# Patient Record
Sex: FEMALE | Race: WHITE | NOT HISPANIC OR LATINO
[De-identification: names, ages, dates, MRNs, and addresses within clinical notes are randomized per-mention and may not be internally consistent; named-entity substitution may affect disease eponyms.]

---

## 2017-10-18 PROBLEM — Z00.00 ENCOUNTER FOR PREVENTIVE HEALTH EXAMINATION: Status: ACTIVE | Noted: 2017-10-18

## 2017-11-01 ENCOUNTER — APPOINTMENT (OUTPATIENT)
Dept: HEART AND VASCULAR | Facility: CLINIC | Age: 82
End: 2017-11-01
Payer: MEDICARE

## 2017-11-01 VITALS
HEIGHT: 62.83 IN | WEIGHT: 113 LBS | HEART RATE: 68 BPM | DIASTOLIC BLOOD PRESSURE: 62 MMHG | OXYGEN SATURATION: 93 % | SYSTOLIC BLOOD PRESSURE: 140 MMHG | BODY MASS INDEX: 20.02 KG/M2 | TEMPERATURE: 98.8 F

## 2017-11-01 DIAGNOSIS — I35.0 NONRHEUMATIC AORTIC (VALVE) STENOSIS: ICD-10-CM

## 2017-11-01 DIAGNOSIS — C50.919 MALIGNANT NEOPLASM OF UNSPECIFIED SITE OF UNSPECIFIED FEMALE BREAST: ICD-10-CM

## 2017-11-01 DIAGNOSIS — I25.10 ATHEROSCLEROTIC HEART DISEASE OF NATIVE CORONARY ARTERY W/OUT ANGINA PECTORIS: ICD-10-CM

## 2017-11-01 DIAGNOSIS — Z86.19 PERSONAL HISTORY OF OTHER INFECTIOUS AND PARASITIC DISEASES: ICD-10-CM

## 2017-11-01 DIAGNOSIS — Z80.9 FAMILY HISTORY OF MALIGNANT NEOPLASM, UNSPECIFIED: ICD-10-CM

## 2017-11-01 DIAGNOSIS — Z82.49 FAMILY HISTORY OF ISCHEMIC HEART DISEASE AND OTHER DISEASES OF THE CIRCULATORY SYSTEM: ICD-10-CM

## 2017-11-01 PROCEDURE — 93000 ELECTROCARDIOGRAM COMPLETE: CPT

## 2017-11-01 PROCEDURE — 93306 TTE W/DOPPLER COMPLETE: CPT

## 2017-11-01 PROCEDURE — 99204 OFFICE O/P NEW MOD 45 MIN: CPT | Mod: 25

## 2017-11-01 RX ORDER — UBIDECARENONE/VIT E ACET 100MG-5
CAPSULE ORAL
Refills: 0 | Status: ACTIVE | COMMUNITY

## 2017-11-01 RX ORDER — POTASSIUM CHLORIDE 10 MEQ
10 CAPSULE, EXTENDED RELEASE ORAL DAILY
Refills: 0 | Status: ACTIVE | COMMUNITY

## 2017-11-01 RX ORDER — OSPEMIFENE 60 MG/1
60 TABLET, FILM COATED ORAL
Refills: 0 | Status: ACTIVE | COMMUNITY

## 2017-11-01 RX ORDER — ETHACRYNIC ACID 25 MG/1
25 TABLET ORAL DAILY
Refills: 0 | Status: ACTIVE | COMMUNITY

## 2017-11-01 RX ORDER — GABAPENTIN 300 MG/1
300 CAPSULE ORAL DAILY
Refills: 0 | Status: ACTIVE | COMMUNITY

## 2017-11-01 RX ORDER — ANASTROZOLE TABLETS 1 MG/1
1 TABLET ORAL
Refills: 0 | Status: ACTIVE | COMMUNITY
Start: 2017-11-01

## 2019-04-19 ENCOUNTER — INPATIENT (INPATIENT)
Facility: HOSPITAL | Age: 84
LOS: 5 days | Discharge: HOME HEALTH | DRG: 291 | End: 2019-04-25
Attending: INTERNAL MEDICINE | Admitting: INTERNAL MEDICINE
Payer: MEDICARE

## 2019-04-19 VITALS
DIASTOLIC BLOOD PRESSURE: 57 MMHG | SYSTOLIC BLOOD PRESSURE: 118 MMHG | WEIGHT: 117.07 LBS | TEMPERATURE: 99 F | RESPIRATION RATE: 18 BRPM | OXYGEN SATURATION: 96 % | HEART RATE: 60 BPM

## 2019-04-19 DIAGNOSIS — Z95.2 PRESENCE OF PROSTHETIC HEART VALVE: Chronic | ICD-10-CM

## 2019-04-19 DIAGNOSIS — I50.21 ACUTE SYSTOLIC (CONGESTIVE) HEART FAILURE: ICD-10-CM

## 2019-04-19 DIAGNOSIS — I48.91 UNSPECIFIED ATRIAL FIBRILLATION: ICD-10-CM

## 2019-04-19 DIAGNOSIS — Z98.890 OTHER SPECIFIED POSTPROCEDURAL STATES: Chronic | ICD-10-CM

## 2019-04-19 DIAGNOSIS — Z92.89 PERSONAL HISTORY OF OTHER MEDICAL TREATMENT: Chronic | ICD-10-CM

## 2019-04-19 DIAGNOSIS — I48.1 PERSISTENT ATRIAL FIBRILLATION: ICD-10-CM

## 2019-04-19 DIAGNOSIS — I10 ESSENTIAL (PRIMARY) HYPERTENSION: ICD-10-CM

## 2019-04-19 DIAGNOSIS — I50.9 HEART FAILURE, UNSPECIFIED: ICD-10-CM

## 2019-04-19 DIAGNOSIS — I35.9 NONRHEUMATIC AORTIC VALVE DISORDER, UNSPECIFIED: ICD-10-CM

## 2019-04-19 LAB
ALBUMIN SERPL ELPH-MCNC: 3 G/DL — LOW (ref 3.3–5)
ALBUMIN SERPL ELPH-MCNC: 3 G/DL — LOW (ref 3.3–5)
ALP SERPL-CCNC: 164 U/L — HIGH (ref 40–120)
ALP SERPL-CCNC: 171 U/L — HIGH (ref 40–120)
ALT FLD-CCNC: 23 U/L — SIGNIFICANT CHANGE UP (ref 10–45)
ALT FLD-CCNC: SIGNIFICANT CHANGE UP U/L (ref 10–45)
ANION GAP SERPL CALC-SCNC: 11 MMOL/L — SIGNIFICANT CHANGE UP (ref 5–17)
AST SERPL-CCNC: 56 U/L — HIGH (ref 10–40)
AST SERPL-CCNC: SIGNIFICANT CHANGE UP U/L (ref 10–40)
BILIRUB DIRECT SERPL-MCNC: <0.2 MG/DL — SIGNIFICANT CHANGE UP (ref 0–0.2)
BILIRUB INDIRECT FLD-MCNC: >0.3 MG/DL — SIGNIFICANT CHANGE UP (ref 0.2–1)
BILIRUB SERPL-MCNC: 0.4 MG/DL — SIGNIFICANT CHANGE UP (ref 0.2–1.2)
BILIRUB SERPL-MCNC: 0.5 MG/DL — SIGNIFICANT CHANGE UP (ref 0.2–1.2)
BUN SERPL-MCNC: 27 MG/DL — HIGH (ref 7–23)
CALCIUM SERPL-MCNC: 8.8 MG/DL — SIGNIFICANT CHANGE UP (ref 8.4–10.5)
CHLORIDE SERPL-SCNC: 105 MMOL/L — SIGNIFICANT CHANGE UP (ref 96–108)
CK MB CFR SERPL CALC: 1.5 NG/ML — SIGNIFICANT CHANGE UP (ref 0–6.7)
CK SERPL-CCNC: 22 U/L — LOW (ref 25–170)
CO2 SERPL-SCNC: 26 MMOL/L — SIGNIFICANT CHANGE UP (ref 22–31)
CREAT SERPL-MCNC: 0.77 MG/DL — SIGNIFICANT CHANGE UP (ref 0.5–1.3)
EXTRA BLUE TOP TUBE: SIGNIFICANT CHANGE UP
GAS PNL BLDV: SIGNIFICANT CHANGE UP
GLUCOSE SERPL-MCNC: 111 MG/DL — HIGH (ref 70–99)
HCT VFR BLD CALC: 35.8 % — SIGNIFICANT CHANGE UP (ref 34.5–45)
HGB BLD-MCNC: 10.1 G/DL — LOW (ref 11.5–15.5)
MAGNESIUM SERPL-MCNC: 2.2 MG/DL — SIGNIFICANT CHANGE UP (ref 1.6–2.6)
MCHC RBC-ENTMCNC: 24.6 PG — LOW (ref 27–34)
MCHC RBC-ENTMCNC: 28.2 GM/DL — LOW (ref 32–36)
MCV RBC AUTO: 87.3 FL — SIGNIFICANT CHANGE UP (ref 80–100)
NRBC # BLD: 0 /100 WBCS — SIGNIFICANT CHANGE UP (ref 0–0)
NT-PROBNP SERPL-SCNC: 3964 PG/ML — HIGH (ref 0–300)
PLATELET # BLD AUTO: 203 K/UL — SIGNIFICANT CHANGE UP (ref 150–400)
POTASSIUM SERPL-MCNC: 3.8 MMOL/L — SIGNIFICANT CHANGE UP (ref 3.5–5.3)
POTASSIUM SERPL-MCNC: SIGNIFICANT CHANGE UP MMOL/L (ref 3.5–5.3)
POTASSIUM SERPL-SCNC: 3.8 MMOL/L — SIGNIFICANT CHANGE UP (ref 3.5–5.3)
POTASSIUM SERPL-SCNC: SIGNIFICANT CHANGE UP MMOL/L (ref 3.5–5.3)
PROT SERPL-MCNC: 7.9 G/DL — SIGNIFICANT CHANGE UP (ref 6–8.3)
PROT SERPL-MCNC: 8 G/DL — SIGNIFICANT CHANGE UP (ref 6–8.3)
RBC # BLD: 4.1 M/UL — SIGNIFICANT CHANGE UP (ref 3.8–5.2)
RBC # FLD: 16.9 % — HIGH (ref 10.3–14.5)
SODIUM SERPL-SCNC: 142 MMOL/L — SIGNIFICANT CHANGE UP (ref 135–145)
TROPONIN T SERPL-MCNC: <0.01 NG/ML — SIGNIFICANT CHANGE UP (ref 0–0.01)
TROPONIN T SERPL-MCNC: <0.01 NG/ML — SIGNIFICANT CHANGE UP (ref 0–0.01)
WBC # BLD: 8.2 K/UL — SIGNIFICANT CHANGE UP (ref 3.8–10.5)
WBC # FLD AUTO: 8.2 K/UL — SIGNIFICANT CHANGE UP (ref 3.8–10.5)

## 2019-04-19 PROCEDURE — 93010 ELECTROCARDIOGRAM REPORT: CPT

## 2019-04-19 PROCEDURE — 71045 X-RAY EXAM CHEST 1 VIEW: CPT | Mod: 26

## 2019-04-19 PROCEDURE — 99223 1ST HOSP IP/OBS HIGH 75: CPT

## 2019-04-19 PROCEDURE — 99285 EMERGENCY DEPT VISIT HI MDM: CPT | Mod: 25

## 2019-04-19 RX ORDER — SPIRONOLACTONE 25 MG/1
12.5 TABLET, FILM COATED ORAL DAILY
Qty: 0 | Refills: 0 | Status: DISCONTINUED | OUTPATIENT
Start: 2019-04-19 | End: 2019-04-20

## 2019-04-19 RX ORDER — ASPIRIN/CALCIUM CARB/MAGNESIUM 324 MG
1 TABLET ORAL
Qty: 0 | Refills: 0 | COMMUNITY

## 2019-04-19 RX ORDER — FONDAPARINUX SODIUM 2.5 MG/.5ML
1 INJECTION, SOLUTION SUBCUTANEOUS
Qty: 0 | Refills: 0 | COMMUNITY

## 2019-04-19 RX ORDER — SPIRONOLACTONE 25 MG/1
0 TABLET, FILM COATED ORAL
Qty: 0 | Refills: 0 | COMMUNITY

## 2019-04-19 RX ORDER — BISOPROLOL FUMARATE 10 MG/1
5 TABLET, FILM COATED ORAL DAILY
Qty: 0 | Refills: 0 | Status: DISCONTINUED | OUTPATIENT
Start: 2019-04-20 | End: 2019-04-20

## 2019-04-19 RX ORDER — FONDAPARINUX SODIUM 2.5 MG/.5ML
0 INJECTION, SOLUTION SUBCUTANEOUS
Qty: 0 | Refills: 0 | COMMUNITY

## 2019-04-19 RX ORDER — ANASTROZOLE 1 MG/1
1 TABLET ORAL
Qty: 0 | Refills: 0 | COMMUNITY

## 2019-04-19 RX ORDER — ANASTROZOLE 1 MG/1
1 TABLET ORAL DAILY
Qty: 0 | Refills: 0 | Status: DISCONTINUED | OUTPATIENT
Start: 2019-04-19 | End: 2019-04-25

## 2019-04-19 RX ORDER — ASPIRIN/CALCIUM CARB/MAGNESIUM 324 MG
81 TABLET ORAL DAILY
Qty: 0 | Refills: 0 | Status: DISCONTINUED | OUTPATIENT
Start: 2019-04-19 | End: 2019-04-25

## 2019-04-19 RX ORDER — RIVAROXABAN 15 MG-20MG
15 KIT ORAL DAILY
Qty: 0 | Refills: 0 | Status: DISCONTINUED | OUTPATIENT
Start: 2019-04-19 | End: 2019-04-25

## 2019-04-19 RX ORDER — FUROSEMIDE 40 MG
40 TABLET ORAL ONCE
Qty: 0 | Refills: 0 | Status: COMPLETED | OUTPATIENT
Start: 2019-04-19 | End: 2019-04-19

## 2019-04-19 RX ORDER — FUROSEMIDE 40 MG
40 TABLET ORAL
Qty: 0 | Refills: 0 | Status: DISCONTINUED | OUTPATIENT
Start: 2019-04-19 | End: 2019-04-20

## 2019-04-19 RX ADMIN — ANASTROZOLE 1 MILLIGRAM(S): 1 TABLET ORAL at 20:44

## 2019-04-19 RX ADMIN — RIVAROXABAN 15 MILLIGRAM(S): KIT at 20:44

## 2019-04-19 RX ADMIN — Medication 40 MILLIGRAM(S): at 17:31

## 2019-04-19 RX ADMIN — Medication 81 MILLIGRAM(S): at 20:33

## 2019-04-19 NOTE — ED PROVIDER NOTE - PHYSICAL EXAMINATION
Constitutional: Well appearing, well nourished, awake, alert, oriented to person, place, time/situation and in no apparent distress.  ENMT: Airway patent. Normal MM  Eyes: Clear bilaterally  Cardiac: Normal rate, regular rhythm.  Heart sounds S1, S2.  No murmurs, rubs or gallops. + JVD, pitting edema to b/l LE  Respiratory: Diminished at bases w/ crackles, R > L. No increased WOB, tachypnea, hypoxia, or accessory mm use. Pt speaks in full sentences.   Gastrointestinal: Abd soft, NT, ND, NABS. No guarding, rebound, or rigidity. No pulsatile abdominal masses. No organomegaly appreciated. No CVAT   Musculoskeletal: Range of motion is not limited. no calf ttp  Neuro: Alert and oriented x 3, face symmetric and speech fluent. Strength 5/5 x 4 ext and symmetric, nml gross motor movement, nml gait. No focal deficits noted.  Skin: Skin normal color for race, warm, dry and intact. Wound to LLE w/o surrounding erythema or purulence  Psych: Alert and oriented to person, place, time/situation. normal mood and affect. no apparent risk to self or others.

## 2019-04-19 NOTE — H&P ADULT - PROBLEM SELECTOR PLAN 2
in afib, rate controlled HR 60s  -continue home Xarelto 15mg BID and bisoprolol 5mg  -EP consult on monday for possible cardioversion

## 2019-04-19 NOTE — ED ADULT NURSE REASSESSMENT NOTE - NS ED NURSE REASSESS COMMENT FT1
Patient a/oX 3, Afib on cardiac monitor, vital signs stable, no new SOB or other  pain complaint.  Furosemide 40mg slow IVP administered as ordered.  For CardioTele admit; room assignment pending.  STable and comfortable.

## 2019-04-19 NOTE — ED ADULT NURSE NOTE - OBJECTIVE STATEMENT
Patient c/o of increasing fatigue and SOB, BLE swelling, states recent blood tests showed diuretic pill ethacrynic acid not working and sent by Dr. Cueva.  PMHx. Afib, CHF, Breast CA .  No difficulty speaking in long sentences.

## 2019-04-19 NOTE — H&P ADULT - PROBLEM SELECTOR PLAN 3
BP stable  -continue home bisoprolol 5mg and spironolactone 12.5mg      VTE ppx: on xarelto  Dispo: pending further diuresis and possible cardioversion monday

## 2019-04-19 NOTE — ED PROVIDER NOTE - PROGRESS NOTE DETAILS
Dr Cueva will be down to the ED to see the pt in a few minutes D/w Dr Cueva: start lasix, admit to his service, tele. He will consult EP for AF

## 2019-04-19 NOTE — H&P ADULT - RS GEN PE MLT RESP DETAILS PC
good air movement/airway patent/no rhonchi/no wheezes/rales/breath sounds equal/respirations non-labored

## 2019-04-19 NOTE — H&P ADULT - HISTORY OF PRESENT ILLNESS
89yo F with PMHx of HTN, HLD, AS s/p TAVR (___), Breast CA s/p lumpectomy, CHF and new afib (on Xarelto) who presented to [facility] ED on 4/19/19 c/o worsening LE edema and SOB x ____. Pt denies ..... CP, palpitations, diaphoresis, dizziness, syncope, ARCHULETA, fatigue, decrease in ET, orthopnea, PND, N/V, melena, hematochezia, fever or chills. In the ED /57, HR 60bpm, T 98.8, RR 18, O2 96%, Labs significant for troponin neg x 1, BNP 3964, EKG revealed ______, CXR wet read revealed R pleural effusion with congestion. Pt was given Lasix 40mg IVP x 1.  Pt admitted to [facility] for further management of acute CHF exacerbation. 89yo F with PMHx of HTN, AS s/p TAVR (2018 at [facility] with Dr. Pantoja), Breast CA s/p lumpectomy 2014, Basal cell carcinoma s/p excision 3/2019, CHF and new afib (on Xarelto) who presented to [facility] ED on 4/19/19 c/o worsening SOB and LE edema x 2 weeks. She endorses SOB at rest and when walking around the house that has been worsening. She states that her LE edema has been worsening for 1 month despite being on ethacrynic acid. She has associated fatigue and decreased in ET, orthopnea using 3 pillows. Pt denies CP, palpitations, diaphoresis, dizziness, syncope, PND, N/V, melena, hematochezia, fever or chills. In the ED /57, HR 60bpm, T 98.8, RR 18, O2 96%, Labs significant for troponin neg x 1, BNP 3964, EKG revealed afib 60bpm, CXR wet read revealed R pleural effusion with congestion. Pt was given Lasix 40mg IVP x 1.  Pt admitted to [facility] for further management of acute CHF exacerbation.

## 2019-04-19 NOTE — H&P ADULT - PROBLEM SELECTOR PLAN 1
Worsening SOB and LE edema, 2+ pitting up to mid calfs B/L, rales in R & L bases, BNP 3964, s/p Lasix 40mg IVP x 1 in ED  -ECHO in AM  -CXR wet read: R pleural effusion with congestion, f/u official read  -trop neg x 1, follow up trop @ 8 and midnight  -continue Lasix 40mg IVP BID and reassess volume status in AM  -hold home ethacrynic acid 25mg  -daily weights, strict I&Os, core measures Worsening SOB and LE edema, 2+ pitting up to mid calfs B/L, rales in R & L bases, BNP 3964, s/p Lasix 40mg IVP x 1 in ED  -ECHO in AM  -CXR wet read: R pleural effusion with congestion, f/u official read  -trop neg x 1, follow up trop @ 8 and midnight  -continue Lasix 40mg IVP BID  -hold home ethacrynic acid 25mg  -daily weights, strict I&Os, core measures

## 2019-04-19 NOTE — CONSULT NOTE ADULT - SUBJECTIVE AND OBJECTIVE BOX
HPI: Patient is a 89y old  Female who presents with a chief complaint of progressive SOB and LE edema.  Pt is s/p a TAVR in late 2017. She did well until several weeks ago when she developed fatigue, SOB, Orthopnea and LE Edema.  Her Cardiologist in [place](Dr Landon Tyson) noted A Fib for the first time and started Xarelto.   Cardizem was DCed and she was placed on Bisoprolol and Aldactone.  She has continued to do poorly in A Fib and is now brought in for diureses to be followed by a Cardioversion.  She recently had an excision of a BBC fro the left LE and the site has not healed well          PAST MEDICAL & SURGICAL HISTORY:  Breast CA  Basal Cell CA  CHF (congestive heart failure), NYHA class I  Afib  HTN  ASHD  Shingles  Left breast Lumpectomy June 2014    PREVIOUS DIAGNOSTIC TESTING:      ECHO  (1/17/19)  NL functioning Ao V, trace I, LVH, elevated LV filling pressure, biatrial enlargementModerate MR, Mod to severe TR, RVSP 56mm, EF 66%  FINDINGS:    STRESS  FINDINGS:    CATHETERIZATION  FINDINGS:    MEDICATIONS  (STANDING):    MEDICATIONS  (PRN):      FAMILY HISTORY: CAD, CA(sister)      SOCIAL HISTORY:    CIGARETTES:  never    ALCOHOL: no    REVIEW OF SYSTEMS:  CONSTITUTIONAL: No fever, weight loss, + fatigue  EYES: No eye pain, visual disturbances, or discharge  ENMT:  No difficulty hearing, tinnitus, vertigo; No sinus or throat pain  NECK: No pain or stiffness  RESPIRATORY: No cough, wheezing, chills or hemoptysis; + Shortness of Breath  CARDIOVASCULAR: No chest pain, palpitations, passing out, dizziness, ++ leg swelling  GASTROINTESTINAL: No abdominal or epigastric pain. No nausea, vomiting, or hematemesis; No diarrhea or constipation. No melena or hematochezia.  GENITOURINARY: No dysuria, frequency, hematuria, or incontinence  NEUROLOGICAL: No headaches, memory loss, loss of strength, numbness, or tremors  SKIN: No itching, burning, rashes, or lesions Recent removal of a BCC from left LE  LYMPH Nodes: No enlarged glands  ENDOCRINE: No heat or cold intolerance; No hair loss  MUSCULOSKELETAL: No joint pain or swelling; No muscle, back, or extremity pain  PSYCHIATRIC: No depression, anxiety, mood swings, or difficulty sleeping  HEME/LYMPH: No easy bruising, or bleeding gums  ALLERY AND IMMUNOLOGIC: No hives or eczema	  Vital Signs Last 24 Hrs  T(C): 37.1 (19 Apr 2019 17:33), Max: 37.1 (19 Apr 2019 14:51)  T(F): 98.8 (19 Apr 2019 17:33), Max: 98.8 (19 Apr 2019 14:51)  HR: 64 (19 Apr 2019 17:33) (60 - 64)  BP: 110/64 (19 Apr 2019 17:33) (110/64 - 123/58)  BP(mean): --  RR: 18 (19 Apr 2019 17:33) (16 - 18)  SpO2: 94% (19 Apr 2019 17:33) (94% - 96%)    PHYSICAL EXAM:  Appearance: Normal	  Neck: + JVD  HEENT:   Normal oral mucosa, PERRL, EOMI	  Lymphatic: No lymphadenopathy  Cardiovascular: Irregular, variable S1 S2, No JVD, SALVADOR and an apical systolic murmur, moderate edema  Respiratory: quiet BS B/L  Psychiatry: A & O x 3, Mood & affect appropriate  Gastrointestinal:  Soft, Non-tender, + BS	  Skin: No rashes, No ecchymoses, No cyanosis, excision site over left LE  Neurologic: Non-focal  Extremities: Normal range of motion, No clubbing, cyanosis, moderate edema  Vascular: Peripheral pulses palpable 2+ bilaterally      INTERPRETATION OF TELEMETRY:   A F in the 60s    ECG:   A Fib, QS in I and L, PRWP VS a prior ASWMI(not seen on echo from January)    I&O's Detail      LABS:                        10.1   8.20  )-----------( 203      ( 19 Apr 2019 15:52 )             35.8     04-19    142  |  105  |  27<H>  ----------------------------<  111<H>  SEE NOTE   |  26  |  0.77    Ca    8.8      19 Apr 2019 15:52  Mg     2.2     04-19    TPro  7.9  /  Alb  3.0<L>  /  TBili  0.4  /  DBili  x   /  AST  SEE NOTE  /  ALT  SEE NOTE  /  AlkPhos  164<H>  04-19    CARDIAC MARKERS ( 19 Apr 2019 15:52 )  x     / <0.01 ng/mL / x     / x     / x              I&O's Summary    BNPSerum Pro-Brain Natriuretic Peptide: 3964 pg/mL (04-19 @ 15:52)    RADIOLOGY & ADDITIONAL STUDIES:

## 2019-04-19 NOTE — ED ADULT NURSE REASSESSMENT NOTE - NS ED NURSE REASSESS COMMENT FT1
Patient a/oX 3, anxious and angry including family at bedside,  about not being able to get Kosher dinner meal and not getting a room right away; explained to patient and family had to wait for 5 Uris RN to be ready for report due to change of shift.  Refused to take Aspirin, Armidex and Xarelto scheduled for tonight, states does not want to take it on empty stomach.  Patient able to ambulate w/ assist prn to bathroom, no c/o of chest pain or SOB at this time, no difficulty speaking in long sentences.  2000H cardiac enzymes and additional labs sent .  ENdorsement report and care received at this time by 5 Urpriyanka Rodriguez [id number] .  Explained to ANDREA Rodriguez patient did not get night time meds as refused meds until has Kosher dinner.  Pateint on tracking to 5 Uris.

## 2019-04-19 NOTE — ED PROVIDER NOTE - NS ED ROS FT
Constitutional: No fever or chills.   Eyes: No pain, blurry vision, or discharge.  ENMT: No hearing changes, pain, discharge or infections. No neck pain or stiffness.  Cardiac: See HPI.  Respiratory: See HPI  GI: No nausea, vomiting, diarrhea or abdominal pain.  : No dysuria, frequency or burning.  MS: No myalgia, muscle weakness, joint pain or back pain.  Neuro: No headache or weakness. No LOC.  Skin: No skin rash. non healing wound to LLE s/p excision of basal cell CA  Endocrine: No history of thyroid disease or diabetes.  Except as documented in the HPI, all other systems are negative.

## 2019-04-19 NOTE — ED PROVIDER NOTE - OBJECTIVE STATEMENT
Pt w/ PMHx HTN, AS s/p TAVR, Breast CA s/p lumpectomy 2014 on Anastrazole, Basal cell carcinoma s/p excision 3/2019, CHF and recent new onset AF (on Xarelto), sent to the ED by Dr Cueva for decompensated HF. Pt reports 2-3 weeks worsening SOB, ARCHULETA, getting SOB w/ little exertion. Pt reports gen fatigue. + 3 pillow orthopnea and LE edema. Denies calf pain, cough, f/c, CP, palpitations, diaphoresis. PT is taking Ethacrynic Acid for diuresis w/o relief.

## 2019-04-19 NOTE — ED ADULT NURSE NOTE - CHPI ED NUR SYMPTOMS NEG
no chest pain/no hemoptysis/no chills/no fever/no headache/no cough/no body aches/no diaphoresis/no wheezing

## 2019-04-19 NOTE — ED ADULT NURSE NOTE - NSIMPLEMENTINTERV_GEN_ALL_ED
Implemented All Universal Safety Interventions:  [place] to call system. Call bell, personal items and telephone within reach. Instruct patient to call for assistance. Room bathroom lighting operational. Non-slip footwear when patient is off stretcher. Physically safe environment: no spills, clutter or unnecessary equipment. Stretcher in lowest position, wheels locked, appropriate side rails in place.

## 2019-04-19 NOTE — H&P ADULT - NSHPLABSRESULTS_GEN_ALL_CORE
10.1   8.20  )-----------( 203      ( 19 Apr 2019 15:52 )             35.8       04-19    142  |  105  |  27<H>  ----------------------------<  111<H>  SEE NOTE   |  26  |  0.77    Ca    8.8      19 Apr 2019 15:52  Mg     2.2     04-19    TPro  7.9  /  Alb  3.0<L>  /  TBili  0.4  /  DBili  <0.2  /  AST  SEE NOTE  /  ALT  SEE NOTE  /  AlkPhos  164<H>  04-19    CARDIAC MARKERS ( 19 Apr 2019 15:52 )  x     / <0.01 ng/mL / x     / x     / x          EKG: afib 60bpm, no ischemic changes

## 2019-04-19 NOTE — H&P ADULT - NSICDXPASTMEDICALHX_GEN_ALL_CORE_FT
PAST MEDICAL HISTORY:  Afib     Breast CA     CHF (congestive heart failure), NYHA class I PAST MEDICAL HISTORY:  Afib     Basal cell carcinoma     Breast CA     CHF (congestive heart failure), NYHA class I     HTN (hypertension)

## 2019-04-19 NOTE — H&P ADULT - ASSESSMENT
89yo F with PMHx of HTN, AS s/p TAVR (2018 at [facility] with Dr. Pantoja), Breast CA s/p lumpectomy 2014, Basal cell carcinoma s/p excision 3/2019, CHF and new afib (on Xarelto) who is admitted to [facility] for acute CHF exacerbation in setting of afib for further management.

## 2019-04-19 NOTE — CONSULT NOTE ADULT - PROBLEM SELECTOR RECOMMENDATION 2
Will need new echo, EP consult and a DC CV.  I believe she is not tolerating the A F, it is the cause of her decompensation.  Continue Xarelto

## 2019-04-19 NOTE — ED PROVIDER NOTE - CLINICAL SUMMARY MEDICAL DECISION MAKING FREE TEXT BOX
Pt p/w sx decompensated HF, likely 2/2 newly diagnosed AF. Failing out pt diuresis. Check labs, EKG, CXR, d/w cardiology. Anticipate admission for IV diuresis.

## 2019-04-20 LAB
ALBUMIN SERPL ELPH-MCNC: 3 G/DL — LOW (ref 3.3–5)
ALP SERPL-CCNC: 165 U/L — HIGH (ref 40–120)
ALT FLD-CCNC: 20 U/L — SIGNIFICANT CHANGE UP (ref 10–45)
ANION GAP SERPL CALC-SCNC: 11 MMOL/L — SIGNIFICANT CHANGE UP (ref 5–17)
AST SERPL-CCNC: 24 U/L — SIGNIFICANT CHANGE UP (ref 10–40)
BASOPHILS # BLD AUTO: 0.05 K/UL — SIGNIFICANT CHANGE UP (ref 0–0.2)
BASOPHILS NFR BLD AUTO: 0.7 % — SIGNIFICANT CHANGE UP (ref 0–2)
BILIRUB SERPL-MCNC: 0.4 MG/DL — SIGNIFICANT CHANGE UP (ref 0.2–1.2)
BUN SERPL-MCNC: 25 MG/DL — HIGH (ref 7–23)
CALCIUM SERPL-MCNC: 8.7 MG/DL — SIGNIFICANT CHANGE UP (ref 8.4–10.5)
CHLORIDE SERPL-SCNC: 104 MMOL/L — SIGNIFICANT CHANGE UP (ref 96–108)
CHOLEST SERPL-MCNC: 76 MG/DL — SIGNIFICANT CHANGE UP (ref 10–199)
CK MB CFR SERPL CALC: 1.2 NG/ML — SIGNIFICANT CHANGE UP (ref 0–6.7)
CK SERPL-CCNC: 17 U/L — LOW (ref 25–170)
CO2 SERPL-SCNC: 30 MMOL/L — SIGNIFICANT CHANGE UP (ref 22–31)
CREAT SERPL-MCNC: 0.92 MG/DL — SIGNIFICANT CHANGE UP (ref 0.5–1.3)
EOSINOPHIL # BLD AUTO: 0.16 K/UL — SIGNIFICANT CHANGE UP (ref 0–0.5)
EOSINOPHIL NFR BLD AUTO: 2.3 % — SIGNIFICANT CHANGE UP (ref 0–6)
GLUCOSE SERPL-MCNC: 116 MG/DL — HIGH (ref 70–99)
HBA1C BLD-MCNC: 4.9 % — SIGNIFICANT CHANGE UP (ref 4–5.6)
HCT VFR BLD CALC: 34.6 % — SIGNIFICANT CHANGE UP (ref 34.5–45)
HDLC SERPL-MCNC: 40 MG/DL — LOW
HGB BLD-MCNC: 9.6 G/DL — LOW (ref 11.5–15.5)
IMM GRANULOCYTES NFR BLD AUTO: 0.6 % — SIGNIFICANT CHANGE UP (ref 0–1.5)
LIPID PNL WITH DIRECT LDL SERPL: 27 MG/DL — SIGNIFICANT CHANGE UP
LYMPHOCYTES # BLD AUTO: 2.83 K/UL — SIGNIFICANT CHANGE UP (ref 1–3.3)
LYMPHOCYTES # BLD AUTO: 40.5 % — SIGNIFICANT CHANGE UP (ref 13–44)
MAGNESIUM SERPL-MCNC: 2 MG/DL — SIGNIFICANT CHANGE UP (ref 1.6–2.6)
MCHC RBC-ENTMCNC: 24.4 PG — LOW (ref 27–34)
MCHC RBC-ENTMCNC: 27.7 GM/DL — LOW (ref 32–36)
MCV RBC AUTO: 87.8 FL — SIGNIFICANT CHANGE UP (ref 80–100)
MONOCYTES # BLD AUTO: 1.06 K/UL — HIGH (ref 0–0.9)
MONOCYTES NFR BLD AUTO: 15.2 % — HIGH (ref 2–14)
NEUTROPHILS # BLD AUTO: 2.85 K/UL — SIGNIFICANT CHANGE UP (ref 1.8–7.4)
NEUTROPHILS NFR BLD AUTO: 40.7 % — LOW (ref 43–77)
NRBC # BLD: 0 /100 WBCS — SIGNIFICANT CHANGE UP (ref 0–0)
PLATELET # BLD AUTO: 200 K/UL — SIGNIFICANT CHANGE UP (ref 150–400)
POTASSIUM SERPL-MCNC: 4.2 MMOL/L — SIGNIFICANT CHANGE UP (ref 3.5–5.3)
POTASSIUM SERPL-SCNC: 4.2 MMOL/L — SIGNIFICANT CHANGE UP (ref 3.5–5.3)
PROT SERPL-MCNC: 7.6 G/DL — SIGNIFICANT CHANGE UP (ref 6–8.3)
RBC # BLD: 3.94 M/UL — SIGNIFICANT CHANGE UP (ref 3.8–5.2)
RBC # FLD: 16.8 % — HIGH (ref 10.3–14.5)
SODIUM SERPL-SCNC: 145 MMOL/L — SIGNIFICANT CHANGE UP (ref 135–145)
T4 AB SER-ACNC: 6.38 UG/DL — SIGNIFICANT CHANGE UP (ref 3.17–11.72)
TOTAL CHOLESTEROL/HDL RATIO MEASUREMENT: 1.9 RATIO — LOW (ref 3.3–7.1)
TRIGL SERPL-MCNC: 47 MG/DL — SIGNIFICANT CHANGE UP (ref 10–149)
TROPONIN T SERPL-MCNC: <0.01 NG/ML — SIGNIFICANT CHANGE UP (ref 0–0.01)
TSH SERPL-MCNC: 2.56 UIU/ML — SIGNIFICANT CHANGE UP (ref 0.35–4.94)
WBC # BLD: 6.99 K/UL — SIGNIFICANT CHANGE UP (ref 3.8–10.5)
WBC # FLD AUTO: 6.99 K/UL — SIGNIFICANT CHANGE UP (ref 3.8–10.5)

## 2019-04-20 PROCEDURE — 99232 SBSQ HOSP IP/OBS MODERATE 35: CPT

## 2019-04-20 PROCEDURE — 93306 TTE W/DOPPLER COMPLETE: CPT | Mod: 26

## 2019-04-20 RX ORDER — BISOPROLOL FUMARATE 10 MG/1
5 TABLET, FILM COATED ORAL DAILY
Qty: 0 | Refills: 0 | Status: DISCONTINUED | OUTPATIENT
Start: 2019-04-21 | End: 2019-04-25

## 2019-04-20 RX ORDER — BISOPROLOL FUMARATE 10 MG/1
5 TABLET, FILM COATED ORAL ONCE
Qty: 0 | Refills: 0 | Status: COMPLETED | OUTPATIENT
Start: 2019-04-20 | End: 2019-04-20

## 2019-04-20 RX ORDER — FUROSEMIDE 40 MG
40 TABLET ORAL
Qty: 0 | Refills: 0 | Status: DISCONTINUED | OUTPATIENT
Start: 2019-04-20 | End: 2019-04-24

## 2019-04-20 RX ORDER — SPIRONOLACTONE 25 MG/1
12.5 TABLET, FILM COATED ORAL DAILY
Qty: 0 | Refills: 0 | Status: DISCONTINUED | OUTPATIENT
Start: 2019-04-20 | End: 2019-04-25

## 2019-04-20 RX ADMIN — RIVAROXABAN 15 MILLIGRAM(S): KIT at 17:43

## 2019-04-20 RX ADMIN — Medication 40 MILLIGRAM(S): at 17:43

## 2019-04-20 RX ADMIN — ANASTROZOLE 1 MILLIGRAM(S): 1 TABLET ORAL at 21:19

## 2019-04-20 RX ADMIN — Medication 1 APPLICATION(S): at 13:30

## 2019-04-20 RX ADMIN — BISOPROLOL FUMARATE 5 MILLIGRAM(S): 10 TABLET, FILM COATED ORAL at 13:30

## 2019-04-20 RX ADMIN — SPIRONOLACTONE 12.5 MILLIGRAM(S): 25 TABLET, FILM COATED ORAL at 12:54

## 2019-04-20 RX ADMIN — Medication 81 MILLIGRAM(S): at 21:20

## 2019-04-20 RX ADMIN — Medication 40 MILLIGRAM(S): at 06:41

## 2019-04-20 NOTE — PROGRESS NOTE ADULT - PROBLEM SELECTOR PLAN 2
in afib, rate controlled HR 60s  -continue home Xarelto 15mg BID and bisoprolol 5mg  -EP consult for possible cardioversion. in afib, rate controlled HR 60s  -continue home Xarelto 15mg BID and bisoprolol 5mg  -EP consult for possible cardioversion on Monday if euvolemic per Dr. Cueva

## 2019-04-20 NOTE — PROGRESS NOTE ADULT - SUBJECTIVE AND OBJECTIVE BOX
Interventional Cardiology PA Adult Progress Note    CC: SOB and LE edema on admission  Subjective Assessment: Pt examined at bedside this AM. States SOB is improved. Denies CP or palpitations  ROS negative except as per subjective   	  MEDICATIONS:  bisoprolol   Tablet 5 milliGRAM(s) Oral daily  furosemide   Injectable 40 milliGRAM(s) IV Push two times a day  spironolactone 12.5 milliGRAM(s) Oral daily  anastrozole 1 milliGRAM(s) Oral daily  aspirin enteric coated 81 milliGRAM(s) Oral daily  rivaroxaban 15 milliGRAM(s) Oral daily      	    [PHYSICAL EXAM:  TELEMETRY:  T(C): 36.2 (04-20-19 @ 09:19), Max: 37.2 (04-20-19 @ 06:14)  HR: 64 (04-20-19 @ 09:06) (57 - 65)  BP: 127/55 (04-20-19 @ 09:06) (91/50 - 131/62)  RR: 16 (04-20-19 @ 09:06) (16 - 18)  SpO2: 97% (04-20-19 @ 09:06) (94% - 99%)    I&O's Summary    19 Apr 2019 07:01  -  20 Apr 2019 07:00  --------------------------------------------------------  IN: 0 mL / OUT: 200 mL / NET: -200 mL    20 Apr 2019 07:01  -  20 Apr 2019 10:56  --------------------------------------------------------  IN: 180 mL / OUT: 100 mL / NET: 80 mL      Height (cm): 160.02 (04-19 @ 21:23)  Weight (kg): 53.3 (04-19 @ 21:23)  BMI (kg/m2): 20.8 (04-19 @ 21:23)  BSA (m2): 1.54 (04-19 @ 21:23)    Meade: No  Central/PICC/Mid Line: No                                        Appearance: Normal	  HEENT:   Normal oral mucosa, PERRL, EOMI	  Neck: Supple, + JVD/ - JVD; Carotid Bruit   Cardiovascular: irregular rate and rhythm   Respiratory: bibasilar rales  Gastrointestinal:  Soft, Non-tender, + BS	  Skin: open wound of L ankle, no signs of infection  Extremities: 1-2+ pitting edema b/l   Vascular: Peripheral pulses palpable 2+ bilaterally  Neurologic: Non-focal  Psychiatry: A & O x 3, Mood & affect appropriate      	    	    LABS:	 	                        9.6    6.99  )-----------( 200      ( 20 Apr 2019 07:44 )             34.6     04-20    145  |  104  |  25<H>  ----------------------------<  116<H>  4.2   |  30  |  0.92    Ca    8.7      20 Apr 2019 07:44  Mg     2.0     04-20    TPro  7.6  /  Alb  3.0<L>  /  TBili  0.4  /  DBili  x   /  AST  24  /  ALT  20  /  AlkPhos  165<H>  04-20    proBNP: Serum Pro-Brain Natriuretic Peptide: 3964 pg/mL (04-19 @ 15:52)      HgA1c: Hemoglobin A1C, Whole Blood: 4.9 % (04-20 @ 07:44)

## 2019-04-21 LAB
ANION GAP SERPL CALC-SCNC: 8 MMOL/L — SIGNIFICANT CHANGE UP (ref 5–17)
BUN SERPL-MCNC: 26 MG/DL — HIGH (ref 7–23)
CALCIUM SERPL-MCNC: 8.8 MG/DL — SIGNIFICANT CHANGE UP (ref 8.4–10.5)
CHLORIDE SERPL-SCNC: 100 MMOL/L — SIGNIFICANT CHANGE UP (ref 96–108)
CO2 SERPL-SCNC: 34 MMOL/L — HIGH (ref 22–31)
CREAT SERPL-MCNC: 0.84 MG/DL — SIGNIFICANT CHANGE UP (ref 0.5–1.3)
GLUCOSE SERPL-MCNC: 96 MG/DL — SIGNIFICANT CHANGE UP (ref 70–99)
HCT VFR BLD CALC: 35.1 % — SIGNIFICANT CHANGE UP (ref 34.5–45)
HGB BLD-MCNC: 10 G/DL — LOW (ref 11.5–15.5)
MAGNESIUM SERPL-MCNC: 2 MG/DL — SIGNIFICANT CHANGE UP (ref 1.6–2.6)
MCHC RBC-ENTMCNC: 24.6 PG — LOW (ref 27–34)
MCHC RBC-ENTMCNC: 28.5 GM/DL — LOW (ref 32–36)
MCV RBC AUTO: 86.2 FL — SIGNIFICANT CHANGE UP (ref 80–100)
NRBC # BLD: 0 /100 WBCS — SIGNIFICANT CHANGE UP (ref 0–0)
PLATELET # BLD AUTO: 204 K/UL — SIGNIFICANT CHANGE UP (ref 150–400)
POTASSIUM SERPL-MCNC: 3.8 MMOL/L — SIGNIFICANT CHANGE UP (ref 3.5–5.3)
POTASSIUM SERPL-SCNC: 3.8 MMOL/L — SIGNIFICANT CHANGE UP (ref 3.5–5.3)
RBC # BLD: 4.07 M/UL — SIGNIFICANT CHANGE UP (ref 3.8–5.2)
RBC # FLD: 16.7 % — HIGH (ref 10.3–14.5)
SODIUM SERPL-SCNC: 142 MMOL/L — SIGNIFICANT CHANGE UP (ref 135–145)
WBC # BLD: 7.84 K/UL — SIGNIFICANT CHANGE UP (ref 3.8–10.5)
WBC # FLD AUTO: 7.84 K/UL — SIGNIFICANT CHANGE UP (ref 3.8–10.5)

## 2019-04-21 PROCEDURE — 99232 SBSQ HOSP IP/OBS MODERATE 35: CPT

## 2019-04-21 RX ORDER — POTASSIUM CHLORIDE 20 MEQ
20 PACKET (EA) ORAL ONCE
Qty: 0 | Refills: 0 | Status: DISCONTINUED | OUTPATIENT
Start: 2019-04-21 | End: 2019-04-21

## 2019-04-21 RX ADMIN — RIVAROXABAN 15 MILLIGRAM(S): KIT at 17:51

## 2019-04-21 RX ADMIN — Medication 1 APPLICATION(S): at 12:10

## 2019-04-21 RX ADMIN — Medication 81 MILLIGRAM(S): at 22:31

## 2019-04-21 RX ADMIN — SPIRONOLACTONE 12.5 MILLIGRAM(S): 25 TABLET, FILM COATED ORAL at 07:12

## 2019-04-21 RX ADMIN — ANASTROZOLE 1 MILLIGRAM(S): 1 TABLET ORAL at 22:31

## 2019-04-21 RX ADMIN — Medication 40 MILLIGRAM(S): at 17:51

## 2019-04-21 RX ADMIN — BISOPROLOL FUMARATE 5 MILLIGRAM(S): 10 TABLET, FILM COATED ORAL at 12:06

## 2019-04-21 RX ADMIN — Medication 40 MILLIGRAM(S): at 06:07

## 2019-04-21 NOTE — PROGRESS NOTE ADULT - PROBLEM SELECTOR PLAN 2
in afib, rate controlled HR 60s  - continue home Xarelto 15mg BID and bisoprolol 5mg  - EP consult for possible cardioversion on Monday if euvolemic per Dr. Cueva

## 2019-04-21 NOTE — PHYSICAL THERAPY INITIAL EVALUATION ADULT - PLANNED THERAPY INTERVENTIONS, PT EVAL
bed mobility training/transfer training/postural re-education/strengthening/balance training/gait training

## 2019-04-21 NOTE — PHYSICAL THERAPY INITIAL EVALUATION ADULT - ADDITIONAL COMMENTS
no steps upon entry, has a house keeper who helps with heavy chores 1x/week, patient independent with all ADL's & iADL's except some cooking/cleaning by . Patient still driving and running errands, denies falls, denies DME use at home, no formal exercise routine.

## 2019-04-21 NOTE — PHYSICAL THERAPY INITIAL EVALUATION ADULT - GENERAL OBSERVATIONS, REHAB EVAL
Patient received semi fowlers (+) EKG (+) heplock patient ambulatory in the hallway, limited by fatigue, impaired strength

## 2019-04-21 NOTE — PROGRESS NOTE ADULT - SUBJECTIVE AND OBJECTIVE BOX
Interventional Cardiology PA Adult Progress Note    Subjective Assessment: Patient seen and examined this morning, feeling very weak today, continued LE swelling  12 point review of systems otherwise negative except per HPI and subjective  	  MEDICATIONS:  bisoprolol   Tablet 5 milliGRAM(s) Oral daily  furosemide   Injectable 40 milliGRAM(s) IV Push two times a day  spironolactone 12.5 milliGRAM(s) Oral daily  anastrozole 1 milliGRAM(s) Oral daily  aspirin enteric coated 81 milliGRAM(s) Oral daily  rivaroxaban 15 milliGRAM(s) Oral daily  silver sulfADIAZINE 1% Cream 1 Application(s) Topical daily  	    [PHYSICAL EXAM:  TELEMETRY:  T(C): 35.8 (04-21-19 @ 08:44), Max: 37.2 (04-21-19 @ 06:09)  HR: 64 (04-21-19 @ 07:12) (61 - 65)  BP: 120/58 (04-21-19 @ 07:12) (106/59 - 137/61)  RR: 16 (04-21-19 @ 05:58) (16 - 16)  SpO2: 94% (04-21-19 @ 05:58) (94% - 97%)    I&O's Summary    20 Apr 2019 07:01  -  21 Apr 2019 07:00  --------------------------------------------------------  IN: 360 mL / OUT: 1180 mL / NET: -820 mL    21 Apr 2019 07:01  -  21 Apr 2019 11:56  --------------------------------------------------------  IN: 120 mL / OUT: 400 mL / NET: -280 mL                                      Appearance: Normal	  HEENT:   Normal oral mucosa, PERRL, EOMI	  Neck: Supple, + JVD  Cardiovascular: Normal S1 S2, No JVD, No murmurs,   Respiratory: faint crackles b/l  Gastrointestinal:  Soft, Non-tender, + BS	  Skin: No rashes, No ecchymoses, No cyanosis  Extremities: 2+ pitting edema  Vascular: Peripheral pulses palpable 2+ bilaterally  Neurologic: Non-focal  Psychiatry: A & O x 3, Mood & affect appropriate      LABS:	 	  CARDIAC MARKERS:                        10.0   7.84  )-----------( 204      ( 21 Apr 2019 07:33 )             35.1     04-21    142  |  100  |  26<H>  ----------------------------<  96  3.8   |  34<H>  |  0.84    Ca    8.8      21 Apr 2019 07:33  Mg     2.0     04-21    TPro  7.6  /  Alb  3.0<L>  /  TBili  0.4  /  DBili  x   /  AST  24  /  ALT  20  /  AlkPhos  165<H>  04-20      ASSESSMENT/PLAN:

## 2019-04-21 NOTE — PHYSICAL THERAPY INITIAL EVALUATION ADULT - MODALITIES TREATMENT COMMENTS
discussed positioning for edema control, Home exercise program including LAQ, ankle pumps/circles/alphabet, seated marching, patient expressed good understanding

## 2019-04-21 NOTE — PHYSICAL THERAPY INITIAL EVALUATION ADULT - PERTINENT HX OF CURRENT PROBLEM, REHAB EVAL
89 yo female presents with SOB at rest found to have CHF exacerbation, seen for PT [facility] hospital day # 3

## 2019-04-22 ENCOUNTER — TRANSCRIPTION ENCOUNTER (OUTPATIENT)
Age: 84
End: 2019-04-22

## 2019-04-22 LAB
ANION GAP SERPL CALC-SCNC: 8 MMOL/L — SIGNIFICANT CHANGE UP (ref 5–17)
BUN SERPL-MCNC: 27 MG/DL — HIGH (ref 7–23)
CALCIUM SERPL-MCNC: 8.4 MG/DL — SIGNIFICANT CHANGE UP (ref 8.4–10.5)
CHLORIDE SERPL-SCNC: 102 MMOL/L — SIGNIFICANT CHANGE UP (ref 96–108)
CO2 SERPL-SCNC: 33 MMOL/L — HIGH (ref 22–31)
CREAT SERPL-MCNC: 0.84 MG/DL — SIGNIFICANT CHANGE UP (ref 0.5–1.3)
GLUCOSE SERPL-MCNC: 99 MG/DL — SIGNIFICANT CHANGE UP (ref 70–99)
HCT VFR BLD CALC: 32.6 % — LOW (ref 34.5–45)
HGB BLD-MCNC: 9.3 G/DL — LOW (ref 11.5–15.5)
MAGNESIUM SERPL-MCNC: 1.9 MG/DL — SIGNIFICANT CHANGE UP (ref 1.6–2.6)
MCHC RBC-ENTMCNC: 24.2 PG — LOW (ref 27–34)
MCHC RBC-ENTMCNC: 28.5 GM/DL — LOW (ref 32–36)
MCV RBC AUTO: 84.7 FL — SIGNIFICANT CHANGE UP (ref 80–100)
NRBC # BLD: 0 /100 WBCS — SIGNIFICANT CHANGE UP (ref 0–0)
PLATELET # BLD AUTO: 189 K/UL — SIGNIFICANT CHANGE UP (ref 150–400)
POTASSIUM SERPL-MCNC: 3.8 MMOL/L — SIGNIFICANT CHANGE UP (ref 3.5–5.3)
POTASSIUM SERPL-SCNC: 3.8 MMOL/L — SIGNIFICANT CHANGE UP (ref 3.5–5.3)
RBC # BLD: 3.85 M/UL — SIGNIFICANT CHANGE UP (ref 3.8–5.2)
RBC # FLD: 16.7 % — HIGH (ref 10.3–14.5)
SODIUM SERPL-SCNC: 143 MMOL/L — SIGNIFICANT CHANGE UP (ref 135–145)
WBC # BLD: 7.95 K/UL — SIGNIFICANT CHANGE UP (ref 3.8–10.5)
WBC # FLD AUTO: 7.95 K/UL — SIGNIFICANT CHANGE UP (ref 3.8–10.5)

## 2019-04-22 PROCEDURE — 99232 SBSQ HOSP IP/OBS MODERATE 35: CPT

## 2019-04-22 PROCEDURE — 99223 1ST HOSP IP/OBS HIGH 75: CPT

## 2019-04-22 RX ORDER — MAGNESIUM OXIDE 400 MG ORAL TABLET 241.3 MG
400 TABLET ORAL ONCE
Qty: 0 | Refills: 0 | Status: COMPLETED | OUTPATIENT
Start: 2019-04-22 | End: 2019-04-22

## 2019-04-22 RX ADMIN — Medication 81 MILLIGRAM(S): at 21:15

## 2019-04-22 RX ADMIN — Medication 40 MILLIGRAM(S): at 17:21

## 2019-04-22 RX ADMIN — MAGNESIUM OXIDE 400 MG ORAL TABLET 400 MILLIGRAM(S): 241.3 TABLET ORAL at 07:41

## 2019-04-22 RX ADMIN — RIVAROXABAN 15 MILLIGRAM(S): KIT at 17:22

## 2019-04-22 RX ADMIN — Medication 40 MILLIGRAM(S): at 05:51

## 2019-04-22 RX ADMIN — Medication 1 APPLICATION(S): at 11:39

## 2019-04-22 RX ADMIN — ANASTROZOLE 1 MILLIGRAM(S): 1 TABLET ORAL at 21:15

## 2019-04-22 RX ADMIN — BISOPROLOL FUMARATE 5 MILLIGRAM(S): 10 TABLET, FILM COATED ORAL at 06:34

## 2019-04-22 RX ADMIN — SPIRONOLACTONE 12.5 MILLIGRAM(S): 25 TABLET, FILM COATED ORAL at 05:50

## 2019-04-22 NOTE — CONSULT NOTE ADULT - ASSESSMENT
89yo F with PMHx of HTN, AS s/p TAVR (2018 at [facility] with Dr. Pantoja), Breast CA s/p lumpectomy 2014, Basal cell carcinoma s/p excision 3/2019, HFpEF and Atrial Fibrillation not on Xarelto prior to current hospitalization, currently admitted for HF exacerbation. Consult for possible WILIAM/DCCV to assist with HF symptoms. [id number].     -Currently in Afib, rate controlled.  -Continue with Bisoprolol 5mg  -Continue with Xarelto 15mg daily  -Keep NPO for possible WILIAM/DCCV.  -CHF management per primary team.    To discuss with attending. 89yo F with PMHx of HTN, AS s/p TAVR (2018 at [facility] with Dr. Pantoja), Breast CA s/p lumpectomy 2014, Basal cell carcinoma s/p excision 3/2019, HFpEF and Atrial Fibrillation not on Xarelto prior to current hospitalization, currently admitted for HF exacerbation. Consult for possible WILIAM/DCCV to assist with HF symptoms. [id number].     -Currently in Afib, rate controlled.  -Continue with Bisoprolol 5mg  -Continue with Xarelto 15mg daily  -NPO after midnight for WILIAM/DCCV tomorrow.   -CHF management per primary team.    Discussed with attending and primary team.

## 2019-04-22 NOTE — PROGRESS NOTE ADULT - PROBLEM SELECTOR PLAN 2
in afib, rate controlled HR 50-60s  - continue home Xarelto 15mg QD (prescribed as O/P 2mo ago but did not start taking until this hospital stay)  - Continue bisoprolol 5mg PO QD  - EP consulted this AM.  f/u Recs.  NPO for possible WILIAM/DCCV if indicated.

## 2019-04-22 NOTE — PROGRESS NOTE ADULT - SUBJECTIVE AND OBJECTIVE BOX
Interventional Cardiology PA Adult Progress Note    Subjective Assessment: Patient seen and examined at bedside, feeling well, improved breathing, still feeling weak  ROS negative except per HPI and subjective  	  MEDICATIONS:  bisoprolol   Tablet 5 milliGRAM(s) Oral daily  furosemide   Injectable 40 milliGRAM(s) IV Push two times a day  spironolactone 12.5 milliGRAM(s) Oral daily  anastrozole 1 milliGRAM(s) Oral daily  aspirin enteric coated 81 milliGRAM(s) Oral daily  rivaroxaban 15 milliGRAM(s) Oral daily  silver sulfADIAZINE 1% Cream 1 Application(s) Topical daily    [PHYSICAL EXAM:  TELEMETRY:  T(C): 36.4 (04-22-19 @ 08:36), Max: 37.2 (04-22-19 @ 00:52)  HR: 66 (04-22-19 @ 09:15) (59 - 70)  BP: 128/59 (04-22-19 @ 09:15) (101/53 - 159/70)  RR: 13 (04-22-19 @ 09:15) (13 - 17)  SpO2: 96% (04-22-19 @ 09:15) (93% - 97%)    I&O's Summary    21 Apr 2019 07:01  -  22 Apr 2019 07:00  --------------------------------------------------------  IN: 480 mL / OUT: 800 mL / NET: -320 mL    22 Apr 2019 07:01  -  22 Apr 2019 11:12  --------------------------------------------------------  IN: 0 mL / OUT: 0 mL / NET: 0 mL                                    Appearance: Normal	  HEENT:   Normal oral mucosa, PERRL, EOMI	  Neck: Supple, + JVD  Cardiovascular: irregular rate  Respiratory: mild crackles b/l  Gastrointestinal:  Soft, Non-tender, + BS	  Skin: No rashes, No ecchymoses, No cyanosis  Extremities: 2+ edema  Vascular: Peripheral pulses palpable 2+ bilaterally  Neurologic: Non-focal  Psychiatry: A & O x 3, Mood & affect appropriate  	    LABS:	 	  CARDIAC MARKERS:                          9.3    7.95  )-----------( 189      ( 22 Apr 2019 05:45 )             32.6     04-22    143  |  102  |  27<H>  ----------------------------<  99  3.8   |  33<H>  |  0.84    Ca    8.4      22 Apr 2019 05:45  Mg     1.9     04-22        ASSESSMENT/PLAN:

## 2019-04-22 NOTE — DISCHARGE NOTE PROVIDER - CARE PROVIDER_API CALL
Indra Cueva)  Cardiovascular Disease; Internal Medicine  Cardiology [facility], [address] [place] [address]  Phone: [phone]  Fax: [phone]  Follow Up Time: Giacomo Tyson  [address], [place] [address]  Phone: [phone]  Fax: (   )    -  Follow Up Time:

## 2019-04-22 NOTE — DISCHARGE NOTE PROVIDER - NSDCCPCAREPLAN_GEN_ALL_CORE_FT
PRINCIPAL DISCHARGE DIAGNOSIS  Diagnosis: Acute decompensated heart failure  Assessment and Plan of Treatment: Please continue lasix 40 mg daily.   You have a history of weakened heart muscle called congestive heart failure. Please make sure you follow up with your cardiologist within one week of discharge.   Please weigh yourself daily: if you have gained more than 2-3 lbs in one day or 5 lbs in one week contact your doctor immediately as you may be retaining water weight.  In addition, restrict your salt intake to less than 2 grams a day.  If you develop worsening shortening of breath, leg swelling, fatigue, chest pain, difficulty sleeping at night due to shortness of breath, contact your cardiologist immediately.  Please follow up with the cardiologist Dr. Cueva in 1 week      SECONDARY DISCHARGE DIAGNOSES  Diagnosis: Atrial fibrillation  Assessment and Plan of Treatment: Please continue Xarelto 15 mg daily and bisoprolol 5 mg daily. You were dignosed with an abnormal heart rhythm for which your heart beats in an uncoordinated rhythm and quickly. During your hospitalization you had a procedure that shocked you heart back into a normal rhythm.    Diagnosis: Hypertension  Assessment and Plan of Treatment: Please continue bisoprolol 5 mg daily and spironolactone 12.5 mg daily. PRINCIPAL DISCHARGE DIAGNOSIS  Diagnosis: Acute decompensated heart failure  Assessment and Plan of Treatment: Please stop ethacrynic acid 25 mg daily and continue lasix 40 mg daily.   You have a history of weakened heart muscle called congestive heart failure. Please make sure you follow up with your cardiologist within one week of discharge.   Please weigh yourself daily: if you have gained more than 2-3 lbs in one day or 5 lbs in one week contact your doctor immediately as you may be retaining water weight.  In addition, restrict your salt intake to less than 2 grams a day.  If you develop worsening shortening of breath, leg swelling, fatigue, chest pain, difficulty sleeping at night due to shortness of breath, contact your cardiologist immediately.  Please follow up with the cardiologist Dr. Cueva in 1 week      SECONDARY DISCHARGE DIAGNOSES  Diagnosis: Atrial fibrillation  Assessment and Plan of Treatment: Please continue Xarelto 15 mg daily and stop bisoprolol 5 mg daily and start bisoprolol 2.5 mg daily. You were dignosed with an abnormal heart rhythm for which your heart beats in an uncoordinated rhythm and quickly. During your hospitalization you had a procedure that shocked you heart back into a normal rhythm.    Diagnosis: Hypertension  Assessment and Plan of Treatment: Please stop bisoprolol 5 mg daily and start bisoprolol 2.5 mg daily and spironolactone 12.5 mg daily. PRINCIPAL DISCHARGE DIAGNOSIS  Diagnosis: Acute decompensated heart failure  Assessment and Plan of Treatment: Please stop ethacrynic acid 25 mg daily and continue lasix 40 mg daily.   You have a history of weakened heart muscle called congestive heart failure. Please make sure you follow up with your cardiologist within one week of discharge.   Please weigh yourself daily: if you have gained more than 2-3 lbs in one day or 5 lbs in one week contact your doctor immediately as you may be retaining water weight.  In addition, restrict your salt intake to less than 2 grams a day.  If you develop worsening shortening of breath, leg swelling, fatigue, chest pain, difficulty sleeping at night due to shortness of breath, contact your cardiologist immediately.  Please follow up with the cardiologist Dr. Tyson on 5/10/19 [facility] [id number]      SECONDARY DISCHARGE DIAGNOSES  Diagnosis: Atrial fibrillation  Assessment and Plan of Treatment: Please continue Xarelto 15 mg daily and stop bisoprolol 5 mg daily and start bisoprolol 2.5 mg daily. You were dignosed with an abnormal heart rhythm for which your heart beats in an uncoordinated rhythm and quickly. During your hospitalization you had a procedure that shocked you heart back into a normal rhythm.    Diagnosis: Hypertension  Assessment and Plan of Treatment: Please stop bisoprolol 5 mg daily and start bisoprolol 2.5 mg daily and spironolactone 12.5 mg daily. PRINCIPAL DISCHARGE DIAGNOSIS  Diagnosis: Acute decompensated heart failure  Assessment and Plan of Treatment: Please stop ethacrynic acid 25 mg daily and continue lasix 40 mg daily.   You have a history of weakened heart muscle called congestive heart failure. Please make sure you follow up with your cardiologist within one week of discharge.   Please weigh yourself daily: if you have gained more than 2-3 lbs in one day or 5 lbs in one week contact your doctor immediately as you may be retaining water weight.  In addition, restrict your salt intake to less than 2 grams a day.  Please restrict the total fluid intake to less than 2Liters per day. If you develop worsening shortening of breath, leg swelling, fatigue, chest pain, difficulty sleeping at night due to shortness of breath, contact your cardiologist immediately.  Please follow up with the cardiologist Dr. Tyson on 5/10/19 @2:45 pm at [address] [facility] [id number]      SECONDARY DISCHARGE DIAGNOSES  Diagnosis: Atrial fibrillation  Assessment and Plan of Treatment: Please continue Xarelto 15 mg daily and stop bisoprolol 5 mg daily and start bisoprolol 2.5 mg daily. You were dignosed with an abnormal heart rhythm for which your heart beats in an uncoordinated rhythm and quickly. During your hospitalization you had a procedure that shocked you heart back into a normal rhythm.    Diagnosis: Hypertension  Assessment and Plan of Treatment: Please stop bisoprolol 5 mg daily and start bisoprolol 2.5 mg daily and spironolactone 12.5 mg daily.

## 2019-04-22 NOTE — DISCHARGE NOTE PROVIDER - HOSPITAL COURSE
89yo F with PMHx of HTN, AS s/p TAVR (2018 at [facility] with Dr. Sanchez), Breast CA s/p lumpectomy 2014, Basal cell carcinoma s/p excision 3/2019, CHF and new afib (on Xarelto) who presented to [facility] ED on 4/19/19 c/o worsening SOB and LE edema x 2 weeks. She endorses SOB at rest and when walking around the house that has been worsening. She states that her LE edema has been worsening for 1 month despite being on ethacrynic acid. She has associated fatigue and decreased in ET, orthopnea using 3 pillows. Pt denies CP, palpitations, diaphoresis, dizziness, syncope, PND, N/V, melena, hematochezia, fever or chills. In the ED /57, HR 60bpm, T 98.8, RR 18, O2 96%, Labs significant for troponin neg x 1, BNP 3964, EKG revealed afib 60bpm, CXR wet read revealed R pleural effusion with congestion. Pt was given Lasix 40mg IVP x 1. Pt admitted to [facility] for further management of acute CHF exacerbation.  EP consulted and now pt is s/p WILIAM and successful DCCV 4/23. Pt s/p IV diuresis and transitioned to PO lasix on day of discharge. Pt will continue home Xarelto for anticoagulation, bisoprolol 5 mg, spironolactone 12.5 mg. Pt's ethacrynic acid 25mg daily was discontinued and discharged on lasix 40 mg daily. 89yo F with PMHx of HTN, AS s/p TAVR (2018 at [facility] with Dr. Sanchez), Breast CA s/p lumpectomy 2014, Basal cell carcinoma s/p excision 3/2019, CHF and new afib (on Xarelto) who presented to [facility] ED on 4/19/19 c/o worsening SOB and LE edema x 2 weeks. She endorses SOB at rest and when walking around the house that has been worsening. She states that her LE edema has been worsening for 1 month despite being on ethacrynic acid. She has associated fatigue and decreased in ET, orthopnea using 3 pillows. Pt denies CP, palpitations, diaphoresis, dizziness, syncope, PND, N/V, melena, hematochezia, fever or chills. In the ED /57, HR 60bpm, T 98.8, RR 18, O2 96%, Labs significant for troponin neg x 1, BNP 3964, EKG revealed afib 60bpm, CXR wet read revealed R pleural effusion with congestion. Pt was given Lasix 40mg IVP x 1. Pt admitted to [facility] for further management of acute CHF exacerbation.  EP consulted and now pt is s/p WILIAM and successful DCCV 4/23. Pt s/p IV diuresis and transitioned to PO lasix on day of discharge. Pt will continue home Xarelto for anticoagulation, bisoprolol 5 mg decreased to 2.5 mg on discharge, spironolactone 12.5 mg. Pt's ethacrynic acid 25mg daily was discontinued and discharged on lasix 40 mg daily. Pt deemed stable per Dr. Cueva and will follow up with her cardiologist Dr. Tyson on 89yo F with PMHx of HTN, AS s/p TAVR (2018 at [facility] with Dr. Sanchez), Breast CA s/p lumpectomy 2014, Basal cell carcinoma s/p excision 3/2019, CHF and new afib (on Xarelto) who presented to [facility] ED on 4/19/19 c/o worsening SOB and LE edema x 2 weeks. She endorses SOB at rest and when walking around the house that has been worsening. She states that her LE edema has been worsening for 1 month despite being on ethacrynic acid. She has associated fatigue and decreased in ET, orthopnea using 3 pillows. Pt denies CP, palpitations, diaphoresis, dizziness, syncope, PND, N/V, melena, hematochezia, fever or chills. In the ED /57, HR 60bpm, T 98.8, RR 18, O2 96%, Labs significant for troponin neg x 1, BNP 3964, EKG revealed afib 60bpm, CXR wet read revealed R pleural effusion with congestion. Pt was given Lasix 40mg IVP x 1. Pt admitted to [facility] for further management of acute CHF exacerbation.  EP consulted and now pt is s/p WILIAM and successful DCCV 4/23. Pt s/p IV diuresis and transitioned to PO lasix on day of discharge. Pt will continue home Xarelto for anticoagulation, bisoprolol 5 mg decreased to 2.5 mg on discharge, spironolactone 12.5 mg. Pt's ethacrynic acid 25mg daily was discontinued and discharged on lasix 40 mg daily. Pt deemed stable per Dr. Cueva and will follow up with her cardiologist Dr. Tyson on 5/10/19 @2:45 pm 89yo F with PMHx of HTN, AS s/p TAVR (2018 at [facility] with Dr. Sanchez), Breast CA s/p lumpectomy 2014, Basal cell carcinoma s/p excision 3/2019, CHF and new afib (on Xarelto) who presented to [facility] ED on 4/19/19 c/o worsening SOB and LE edema x 2 weeks. She endorses SOB at rest and when walking around the house that has been worsening. She states that her LE edema has been worsening for 1 month despite being on ethacrynic acid. She has associated fatigue and decreased in ET, orthopnea using 3 pillows. Pt denies CP, palpitations, diaphoresis, dizziness, syncope, PND, N/V, melena, hematochezia, fever or chills. In the ED /57, HR 60bpm, T 98.8, RR 18, O2 96%, Labs significant for troponin neg x 1, BNP 3964, EKG revealed afib 60bpm, CXR wet read revealed R pleural effusion with congestion. Pt was given Lasix 40mg IVP x 1. Pt admitted to [facility] for further management of acute CHF exacerbation.  EP consulted and now pt is s/p WILIAM and successful DCCV 4/23. Pt s/p IV diuresis and transitioned to PO lasix on day of discharge. Pt will continue home Xarelto for anticoagulation, bisoprolol 5 mg decreased to 2.5 mg on discharge, spironolactone 12.5 mg. Pt's ethacrynic acid 25mg daily was discontinued and discharged on lasix 40 mg daily. Pt evaluated and recommended home with home PT and maximum number of home care hours. Pt deemed stable per Dr. Cueva and will follow up with her cardiologist Dr. Tyson on 5/10/19 @2:45 pm

## 2019-04-22 NOTE — CONSULT NOTE ADULT - SUBJECTIVE AND OBJECTIVE BOX
HPI:  89yo F with PMHx of HTN, AS s/p TAVR (2018 at [facility] with Dr. Pantoja), Breast CA s/p lumpectomy 2014, Basal cell carcinoma s/p excision 3/2019, CHF and new afib (on Xarelto) who presented to [facility] ED on 4/19/19 c/o worsening SOB and LE edema x 2 weeks. She endorses SOB at rest and when walking around the house that has been worsening. She states that her LE edema has been worsening for 1 month despite being on ethacrynic acid. She has associated fatigue and decreased in ET, orthopnea using 3 pillows. Pt denies CP, palpitations, diaphoresis, dizziness, syncope, PND, N/V, melena, hematochezia, fever or chills.     Patient and daughter both state patient's functional status has been declining over the last few months. 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 Over the last few months, she has not been able to leave her house much. She has had worsening SOB at rest and also with exertion. Patient was diagnosed with AFib in December when hospitalized for pneumonia. Patient was supposed to start Xarelto at that time, but patient didn't feel comfortable with   taking the medication based on side effects she read about. Patient only started taking Xarelto during this hospitalization. Patient has never undergone cardioversion or WILIAM in past.     ROS: A 10-point review of systems was otherwise negative.    PAST MEDICAL & SURGICAL HISTORY:  Basal cell carcinoma  HTN (hypertension)  Breast CA  CHF (congestive heart failure), NYHA class I  Afib  S/P TAVR (transcatheter aortic valve replacement)  Presence of pessary  History of lumpectomy      SOCIAL HISTORY:  FAMILY HISTORY:  No pertinent family history in first degree relatives      ALLERGIES: 	  Bactrim (Hives)  penicillin (Hives)            MEDICATIONS:  anastrozole 1 milliGRAM(s) Oral daily  aspirin enteric coated 81 milliGRAM(s) Oral daily  bisoprolol   Tablet 5 milliGRAM(s) Oral daily  furosemide   Injectable 40 milliGRAM(s) IV Push two times a day  rivaroxaban 15 milliGRAM(s) Oral daily  silver sulfADIAZINE 1% Cream 1 Application(s) Topical daily  spironolactone 12.5 milliGRAM(s) Oral daily      PHYSICAL EXAM:  T(C): 36.4 (04-22-19 @ 08:36), Max: 37.2 (04-22-19 @ 00:52)  HR: 66 (04-22-19 @ 09:15) (59 - 70)  BP: 128/59 (04-22-19 @ 09:15) (101/53 - 159/70)  RR: 13 (04-22-19 @ 09:15) (13 - 17)  SpO2: 96% (04-22-19 @ 09:15) (93% - 97%)  Wt(kg): --    GEN: Awake, comfortable. NAD.   HEENT: NCAT, PERRL, EOMI. Mucosa moist. No JVD.   RESP: Decreased BS B/L, R>L at base.   CV: Irregular, normal s1/s2. 3/6 holosystolic murmur at RUSB.   ABD: Soft, NTND. BS+  EXT: Warm. 2+ B/L LE edema with chronic skin changes. LLE healing surgical site.   NEURO: AAOx3. No focal deficits.    I&O's Summary    21 Apr 2019 07:01  -  22 Apr 2019 07:00  --------------------------------------------------------  IN: 480 mL / OUT: 800 mL / NET: -320 mL    22 Apr 2019 07:01  -  22 Apr 2019 11:06  --------------------------------------------------------  IN: 0 mL / OUT: 0 mL / NET: 0 mL        	  LABS:	 	    CARDIAC MARKERS:                                  9.3    7.95  )-----------( 189      ( 22 Apr 2019 05:45 )             32.6     04-22    143  |  102  |  27<H>  ----------------------------<  99  3.8   |  33<H>  |  0.84    Ca    8.4      22 Apr 2019 05:45  Mg     1.9     04-22      proBNP:   Lipid Profile:   HgA1c:   TSH:     TELEMETRY: Afib rates 50-60s    ECG: Afib 50s, low voltage. Right axis deviation and rSR' in V1.   	  RADIOLOGY:   ECHO: EF 77%. Mild LVH. Grade 1 DD with no elevated filling pressures. LA mildly dilated, AUSTIN 38.9. RA severely dilated, PASP 69mmHg.   STRESS:  CATH:

## 2019-04-22 NOTE — DISCHARGE NOTE PROVIDER - NSDCHHHOMEBOUND_GEN_ALL_CORE
Shortness of breath with minimal ambulation/Chest pain/weakness during/after ambulation   greater than 20 feet/Fall risk

## 2019-04-22 NOTE — CONSULT NOTE ADULT - ASSESSMENT
89 y/o female with previous TAVR for severe AS, presented to [facility] in acute systolic heart failure.  Primary team treating heart failure with diuresis, I's and O's and aldactone.  Also patient has new onset afib, EPS consulted.      Plan:  Problem 1: AS: Prosthetic aortic valve seems to be functioning well on echo (above).  No further intervention or work-up needed from SHD standpoint.        Problem 2: Heart failure.  Continue w/ diuresis and PO heart failure meds as tolerated, BP and HR control.  Goal is net negative.  Recommend repeating echo once euvolemic to reassess pulmonary artery pressures.        Problem 3: Afib.  EPS on board.  May try WILIAM/DCCV per their note, to help obtain NSR for better ventricular function/syncrony.        Problem 4: ?CT chest to evaluate the calcifications seen on CXR ?right upper lobe.    I have reviewed clinical labs tests and reports, radiology tests and reports, as well as old patient medical records, and discussed with the refering physician.      Patient to be seen later by Dr. Centeno, then we will likely sign off.  Reconsult if needed.

## 2019-04-22 NOTE — PROGRESS NOTE ADULT - SUBJECTIVE AND OBJECTIVE BOX
INTERVAL HISTORY:  still edematous  	  MEDICATIONS:  bisoprolol   Tablet 5 milliGRAM(s) Oral daily  furosemide   Injectable 40 milliGRAM(s) IV Push two times a day  spironolactone 12.5 milliGRAM(s) Oral daily  anastrozole 1 milliGRAM(s) Oral daily  aspirin enteric coated 81 milliGRAM(s) Oral daily  rivaroxaban 15 milliGRAM(s) Oral daily  silver sulfADIAZINE 1% Cream 1 Application(s) Topical daily        PHYSICAL EXAM:  T(C): 36.9 (04-22-19 @ 05:04), Max: 37.2 (04-22-19 @ 00:52)  HR: 61 (04-22-19 @ 05:23) (59 - 70)  BP: 105/51 (04-22-19 @ 05:23) (101/53 - 159/70)  RR: 17 (04-22-19 @ 05:23) (16 - 17)  SpO2: 95% (04-22-19 @ 05:23) (93% - 97%)  Wt(kg): --  I&O's Summary    21 Apr 2019 07:01  -  22 Apr 2019 07:00  --------------------------------------------------------  IN: 480 mL / OUT: 800 mL / NET: -320 mL          Appearance: Normal	  HEENT:   Normal oral mucosa, PERRL, EOMI	  Lymphatic: No lymphadenopathy  Cardiovascular: Irregular, variablel S1 S2, No JVD, SALVADOR, + edema  Respiratory: Lungs clear to auscultation	  Psychiatry: A & O x 3, Mood & affect appropriate  Gastrointestinal:  Soft, Non-tender, + BS	  Skin: No rashes, No ecchymoses, No cyanosis  Neurologic: Non-focal  Extremities: Normal range of motion, No clubbing, cyanosis, + edema  Vascular: Peripheral pulses palpable1  bilaterally    TELEMETRY: 	  A FIB  ECG:  	  RADIOLOGY:   DIAGNOSTIC TESTING:  [ ] Echocardiogram:  [ ]  Catheterization:  [ ] Stress Test:    OTHER: 	    LABS:	 	    CARDIAC MARKERS:                                  9.3    7.95  )-----------( 189      ( 22 Apr 2019 05:45 )             32.6     04-22    143  |  102  |  27<H>  ----------------------------<  99  3.8   |  33<H>  |  0.84    Ca    8.4      22 Apr 2019 05:45  Mg     1.9     04-22      proBNP:   Lipid Profile:   HgA1c:   TSH:     ASSESSMENT/PLAN:

## 2019-04-22 NOTE — DISCHARGE NOTE PROVIDER - PROVIDER TOKENS
PROVIDER:[TOKEN:[[id number]]] FREE:[LAST:[Jah],FIRST:[Giacomo],PHONE:[phone]],FAX:[(   [phone]    -],ADDRESS:[[address] [place] [address]]]

## 2019-04-22 NOTE — DISCHARGE NOTE PROVIDER - NSDCHHNEEDSERVICE_GEN_ALL_CORE
Rehabilitation services/Teaching and training/Observation and assessment/Medication teaching and assessment

## 2019-04-22 NOTE — CONSULT NOTE ADULT - SUBJECTIVE AND OBJECTIVE BOX
Surgeon: Ney Zamarripa    Requesting Physician: Dr. Cueva    HISTORY OF PRESENT ILLNESS (Need 4):  From HPI:  "89 y/o female with PMHx of HTN, AS s/p TAVR (2018 at [facility] with Dr. Pantoja), Breast CA s/p lumpectomy 2014, Basal cell carcinoma s/p excision 3/2019, CHF and new afib (on Xarelto) who presented to [facility] ED on 4/19/19 c/o worsening SOB and LE edema x 2 weeks. She endorses SOB at rest and when walking around the house that has been worsening. She states that her LE edema has been worsening for 1 month despite being on ethacrynic acid. She has associated fatigue and decreased in ET, orthopnea using 3 pillows. Pt denies CP, palpitations, diaphoresis, dizziness, syncope, PND, N/V, melena, hematochezia, fever or chills. In the ED /57, HR 60bpm, T 98.8, RR 18, O2 96%, Labs significant for troponin neg x 1, BNP 3964, EKG revealed afib 60bpm, CXR wet read revealed R pleural effusion with congestion. Pt was given Lasix 40mg IVP x 1. Pt admitted to [facility] for further management of acute CHF exacerbation."    We were called due to her being s/p TAVR in 2018.  Dr. Centeno was asked to evaluate the valve on echo and provide any further recs.  Currently, she denies any new symptoms.     PAST MEDICAL & SURGICAL HISTORY:  Basal cell carcinoma  HTN (hypertension)  Breast CA  CHF (congestive heart failure), NYHA class I  Afib  S/P TAVR (transcatheter aortic valve replacement)  Presence of pessary  History of lumpectomy      MEDICATIONS  (STANDING):  anastrozole 1 milliGRAM(s) Oral daily  aspirin enteric coated 81 milliGRAM(s) Oral daily  bisoprolol   Tablet 5 milliGRAM(s) Oral daily  furosemide   Injectable 40 milliGRAM(s) IV Push two times a day  rivaroxaban 15 milliGRAM(s) Oral daily  silver sulfADIAZINE 1% Cream 1 Application(s) Topical daily  spironolactone 12.5 milliGRAM(s) Oral daily    MEDICATIONS  (PRN):      Allergies    Bactrim (Hives)  penicillin (Hives)    Intolerances        SOCIAL HISTORY:  Denies smoking, ETOH or drug use.     FAMILY HISTORY:  No pertinent family history in first degree relatives      Review of Systems (Need 10):  CONSTITUTIONAL: Denies fevers / chills, sweats, fatigue, weight loss, weight gain                                       NEURO:  Denies parathesias, seizures, syncope, confusion                                                                                  EYES:  Denies blurry vision, discharge, pain, loss of vision                                                                                    ENMT:  Denies difficulty hearing, vertigo, dysphagia, epistaxis, recent dental work                                       CV:  +LE edema.  Denies chest pain, palpitations, ARCHULETA, orthopnea                                                                                           RESPIRATORY:  +SOB.  Denies Wheezing, cough / sputum, hemoptysis                                                               GI:  Denies nausea, vomiting, diarrhea, constipation, melena                                                                          : Denies hematuria, dysuria, urgency, incontinence                                                                                          MUSKULOSKELETAL:  Denies arthritis, joint swelling, muscle weakness                                                             SKIN/BREAST:  Denies rash, itching, hair loss, masses                                                                                              PSYCH:  Denies depression, anxiety, suicidal ideation                                                                                                HEME/LYMPH:  Denies bruises easily, enlarged lymph nodes, tender lymph nodes                                          ENDOCRINE:  Denies cold intolerance, heat intolerance, polydipsia                                                                      Vital Signs Last 24 Hrs  T(C): 36.4 (22 Apr 2019 08:36), Max: 37.2 (22 Apr 2019 00:52)  T(F): 97.5 (22 Apr 2019 08:36), Max: 99 (22 Apr 2019 00:52)  HR: 60 (22 Apr 2019 11:49) (59 - 70)  BP: 110/54 (22 Apr 2019 11:49) (101/53 - 159/70)  BP(mean): 77 (22 Apr 2019 11:49) (77 - 85)  RR: 12 (22 Apr 2019 11:49) (12 - 17)  SpO2: 97% (22 Apr 2019 11:49) (93% - 97%)    Physical Exam (Need 8)  GEN: NAD, looks comfortable  Psych: Mood appropriate  Neuro: A&Ox3.  No focal deficits.  Moving all extremities.   HEENT: No obvious abnormalities  CV: S1S2, regular, no murmurs appreciated.  No carotid bruits.  No JVD  Lungs: Clear B/L.  No wheezing, rales or rhonchi  ABD: Soft, non-tender, non-distended.  +Bowel sounds  EXT: Warm and well perfused.  No peripheral edema noted  Musculoskeletal: Moving all extremities with normal ROM, no joint swelling  PV: Pedal pulses palpable                                                            LABS:                        9.3    7.95  )-----------( 189      ( 22 Apr 2019 05:45 )             32.6     04-22    143  |  102  |  27<H>  ----------------------------<  99  3.8   |  33<H>  |  0.84    Ca    8.4      22 Apr 2019 05:45  Mg     1.9     04-22            RADIOLOGY & ADDITIONAL STUDIES:  < from: 12 Lead ECG (04.19.19 @ 14:54) >    Diagnosis Line *** Suspect arm lead reversal, interpretation assumes no reversal  Atrial fibrillation with slow ventricular response  Septal infarct , age undetermined  Lateral infarct , age undetermined    < end of copied text >  < from: Echocardiogram (04.20.19 @ 09:44) >  Heart Rate: 59 bpm  Systolic Pressure: 101 mmHg  Diastolic Pressure: 53 mmHg  BSA: 1.5m^2  Interpretation Summary  1. Normal left ventricular size.2. Hyperdynamic left ventricular systolic   function.3. Left ventricular diastolic dysfunction, grade I without   elevated   filling pressure.  4. Mild symmetric left ventricular hypertrophy.5.   Mildly   dilated right ventricle.6. Normal right ventricular systolic   function.7. Mildly dilated left atrium.8. Severely dilated right   atrium.9. s/p   TAVR, normally functioning.10. Severe tricuspid regurgitation.11. Severe   pulmonary hypertension, PASP at least 69mmHg.  Procedure Details  A complete two-dimensional transthoracic echocardiogram was performed (2D,  M-mode, spectral and color flow doppler).  Study Quality: Good.  Left Ventricle  Normal left ventricular size.  Hyperdynamic left ventricular systolic  function with a calculated ejection fraction of 77%. Septal motion is  abnormal consistent with right ventricular pressure and volume overload.  There is mild symmetric left ventricular hypertrophy. Analysis of left  ventricular diastolic function and filling pressure is made challenging by  the presence of significant mitral annular calcification but appears to  reflect grade I left ventricular diastolic dysfunction without elevated  filling pressure.  Left Atrium  The left atrium is mildly dilated.  The left atrial volume index is 38.9 cc/m2 (normal <34cc/m2)  Right Atrium  The right atrium is severely dilated.  The right atrial volume index is 57.7 cc/m2 (normal range 21+/-6 for   women,  25 +/- 7 for men)  Right Ventricle  The right ventricle is mildly dilated.  The tricuspid annular plane systolic excursion (TAPSE) is 17 mm (normal   17mm  or higher).  RV annular tissue Doppler S' 10.4cm/s.  The right ventricular systolic function is normal.  Aortic Valve  A bioprosthetic valve is noted in the aortic position consistent with   known  history of transcutaneous aortic valve replacement (TAVR).  Peak  transvalvular velocity is 2.5m/s, mean gradient is 13.8mmHg, LVOT/AV   velocity  ratio is 0.45, and the aortic valve area (estimated via the continuity  equation) is 1.3cm2 consistent with normal prosthetic valve function.   There  is trace valvular and no paravalvular regurgitation.  Mitral Valve  There is moderate mitral annular calcification.  There is trace mitral regurgitation.  The mean transvalvular gradient is 3mmHg at 55 bpm.  Tricuspid Valve  Structurally normal tricuspid valve.  There is severe tricuspid regurgitation.  The pulmonary artery systolic pressure is estimated to be 69 mmHg.  Due to the presence of severe tricuspid regurgitation pulmonary artery  systolic pressure may be underestimated.  There is no evidence for tricuspid stenosis.  Pulmonic Valve  Structurally normal pulmonic valve.  There is trace pulmonic regurgitation.  There is no pulmonic stenosis.  Arteries and Venous System  No aortic root dilatation.  The inferior vena cava is normal in size (<2.1 cm) with normal inspiratory  collapse (>50%) consistent with normal right atrial pressure.  Pericardium / Pleura  There is no pericardial effusion.    < end of copied text >      Cardiac Cath: Surgeon: Ney Zamarripa    Requesting Physician: Dr. Cueva    HISTORY OF PRESENT ILLNESS (Need 4):  From H&P:  "89 y/o female with PMHx of HTN, AS s/p TAVR (2018 at [facility] with Dr. Pantoja), Breast CA s/p lumpectomy 2014, Basal cell carcinoma s/p excision 3/2019, CHF and new afib (on Xarelto) who presented to [facility] ED on 4/19/19 c/o worsening SOB and LE edema x 2 weeks. She endorses SOB at rest and when walking around the house that has been worsening. She states that her LE edema has been worsening for 1 month despite being on ethacrynic acid. She has associated fatigue and decreased in ET, orthopnea using 3 pillows. Pt denies CP, palpitations, diaphoresis, dizziness, syncope, PND, N/V, melena, hematochezia, fever or chills. In the ED /57, HR 60bpm, T 98.8, RR 18, O2 96%, Labs significant for troponin neg x 1, BNP 3964, EKG revealed afib 60bpm, CXR wet read revealed R pleural effusion with congestion. Pt was given Lasix 40mg IVP x 1. Pt admitted to [facility] for further management of acute CHF exacerbation."    We were called due to her being s/p TAVR in 2018.  Dr. Centeno was asked to evaluate the valve on echo and provide any further recs.  Currently, she denies any new symptoms.  States her breathing is "better" from when she was admitted and her LE edema is "possibly better", she's not quite sure.  She feels "nervous" about the cardioversion.    PAST MEDICAL & SURGICAL HISTORY:  Basal cell carcinoma  HTN (hypertension)  Breast CA  CHF (congestive heart failure), NYHA class I  Afib  S/P TAVR (transcatheter aortic valve replacement)  Presence of pessary  History of lumpectomy      MEDICATIONS  (STANDING):  anastrozole 1 milliGRAM(s) Oral daily  aspirin enteric coated 81 milliGRAM(s) Oral daily  bisoprolol   Tablet 5 milliGRAM(s) Oral daily  furosemide   Injectable 40 milliGRAM(s) IV Push two times a day  rivaroxaban 15 milliGRAM(s) Oral daily  silver sulfADIAZINE 1% Cream 1 Application(s) Topical daily  spironolactone 12.5 milliGRAM(s) Oral daily    MEDICATIONS  (PRN):      Allergies    Bactrim (Hives)  penicillin (Hives)    Intolerances        SOCIAL HISTORY:  Denies smoking, ETOH or drug use.     FAMILY HISTORY:  No pertinent family history in first degree relatives      Review of Systems (Need 10):  CONSTITUTIONAL: Denies fevers / chills, sweats, fatigue, weight loss, weight gain                                       NEURO:  Denies parathesias, seizures, syncope, confusion                                                                                  EYES:  Denies blurry vision, discharge, pain, loss of vision                                                                                    ENMT:  Denies difficulty hearing, vertigo, dysphagia, epistaxis, recent dental work                                       CV:  +LE edema.  Denies chest pain, palpitations, ARCHULETA, orthopnea                                                                                           RESPIRATORY:  +SOB.  Denies Wheezing, cough / sputum, hemoptysis                                                               GI:  Denies nausea, vomiting, diarrhea, constipation, melena                                                                          : Denies hematuria, dysuria, urgency, incontinence                                                                                          MUSKULOSKELETAL:  Denies arthritis, joint swelling, muscle weakness                                                             SKIN/BREAST:  Denies rash, itching, hair loss, masses                                                                                              PSYCH:  Denies depression, anxiety, suicidal ideation                                                                                                HEME/LYMPH:  Denies bruises easily, enlarged lymph nodes, tender lymph nodes                                          ENDOCRINE:  Denies cold intolerance, heat intolerance, polydipsia                                                                      Vital Signs Last 24 Hrs  T(C): 36.4 (22 Apr 2019 08:36), Max: 37.2 (22 Apr 2019 00:52)  T(F): 97.5 (22 Apr 2019 08:36), Max: 99 (22 Apr 2019 00:52)  HR: 60 (22 Apr 2019 11:49) (59 - 70)  BP: 110/54 (22 Apr 2019 11:49) (101/53 - 159/70)  BP(mean): 77 (22 Apr 2019 11:49) (77 - 85)  RR: 12 (22 Apr 2019 11:49) (12 - 17)  SpO2: 97% (22 Apr 2019 11:49) (93% - 97%)    Physical Exam (Need 8)  GEN: NAD, looks comfortable  Psych: Mood appropriate  Neuro: A&Ox3.  No focal deficits.  Moving all extremities.   HEENT: No obvious abnormalities  CV: S1S2, regular, +faint systolic murmur heard 2nd ICS right side. No carotid bruits.  No JVD  Lungs: Clear B/L.  No wheezing, rales or rhonchi  ABD: Soft, non-tender, non-distended.  +Bowel sounds  EXT: Warm and well perfused.  1+ pitting edema B/L LE below knees. +Chronic venous stasis changes mid-shin to ankles   Musculoskeletal: Moving all extremities with normal ROM, no joint swelling  PV: Pedal pulses palpable                                                            LABS:                        9.3    7.95  )-----------( 189      ( 22 Apr 2019 05:45 )             32.6     04-22    143  |  102  |  27<H>  ----------------------------<  99  3.8   |  33<H>  |  0.84    Ca    8.4      22 Apr 2019 05:45  Mg     1.9     04-22            RADIOLOGY & ADDITIONAL STUDIES:  < from: 12 Lead ECG (04.19.19 @ 14:54) >    Diagnosis Line *** Suspect arm lead reversal, interpretation assumes no reversal  Atrial fibrillation with slow ventricular response  Septal infarct , age undetermined  Lateral infarct , age undetermined    < end of copied text >  < from: Echocardiogram (04.20.19 @ 09:44) >  Heart Rate: 59 bpm  Systolic Pressure: 101 mmHg  Diastolic Pressure: 53 mmHg  BSA: 1.5m^2  Interpretation Summary  1. Normal left ventricular size.2. Hyperdynamic left ventricular systolic   function.3. Left ventricular diastolic dysfunction, grade I without   elevated   filling pressure.  4. Mild symmetric left ventricular hypertrophy.5.   Mildly   dilated right ventricle.6. Normal right ventricular systolic   function.7. Mildly dilated left atrium.8. Severely dilated right   atrium.9. s/p   TAVR, normally functioning.10. Severe tricuspid regurgitation.11. Severe   pulmonary hypertension, PASP at least 69mmHg.  Procedure Details  A complete two-dimensional transthoracic echocardiogram was performed (2D,  M-mode, spectral and color flow doppler).  Study Quality: Good.  Left Ventricle  Normal left ventricular size.  Hyperdynamic left ventricular systolic  function with a calculated ejection fraction of 77%. Septal motion is  abnormal consistent with right ventricular pressure and volume overload.  There is mild symmetric left ventricular hypertrophy. Analysis of left  ventricular diastolic function and filling pressure is made challenging by  the presence of significant mitral annular calcification but appears to  reflect grade I left ventricular diastolic dysfunction without elevated  filling pressure.  Left Atrium  The left atrium is mildly dilated.  The left atrial volume index is 38.9 cc/m2 (normal <34cc/m2)  Right Atrium  The right atrium is severely dilated.  The right atrial volume index is 57.7 cc/m2 (normal range 21+/-6 for   women,  25 +/- 7 for men)  Right Ventricle  The right ventricle is mildly dilated.  The tricuspid annular plane systolic excursion (TAPSE) is 17 mm (normal   17mm  or higher).  RV annular tissue Doppler S' 10.4cm/s.  The right ventricular systolic function is normal.  Aortic Valve  A bioprosthetic valve is noted in the aortic position consistent with   known  history of transcutaneous aortic valve replacement (TAVR).  Peak  transvalvular velocity is 2.5m/s, mean gradient is 13.8mmHg, LVOT/AV   velocity  ratio is 0.45, and the aortic valve area (estimated via the continuity  equation) is 1.3cm2 consistent with normal prosthetic valve function.   There  is trace valvular and no paravalvular regurgitation.  Mitral Valve  There is moderate mitral annular calcification.  There is trace mitral regurgitation.  The mean transvalvular gradient is 3mmHg at 55 bpm.  Tricuspid Valve  Structurally normal tricuspid valve.  There is severe tricuspid regurgitation.  The pulmonary artery systolic pressure is estimated to be 69 mmHg.  Due to the presence of severe tricuspid regurgitation pulmonary artery  systolic pressure may be underestimated.  There is no evidence for tricuspid stenosis.  Pulmonic Valve  Structurally normal pulmonic valve.  There is trace pulmonic regurgitation.  There is no pulmonic stenosis.  Arteries and Venous System  No aortic root dilatation.  The inferior vena cava is normal in size (<2.1 cm) with normal inspiratory  collapse (>50%) consistent with normal right atrial pressure.  Pericardium / Pleura  There is no pericardial effusion.    < end of copied text >      Cardiac Cath:

## 2019-04-23 LAB
ANION GAP SERPL CALC-SCNC: 9 MMOL/L — SIGNIFICANT CHANGE UP (ref 5–17)
APTT BLD: 37.9 SEC — HIGH (ref 27.5–36.3)
BUN SERPL-MCNC: 27 MG/DL — HIGH (ref 7–23)
CALCIUM SERPL-MCNC: 9 MG/DL — SIGNIFICANT CHANGE UP (ref 8.4–10.5)
CHLORIDE SERPL-SCNC: 98 MMOL/L — SIGNIFICANT CHANGE UP (ref 96–108)
CO2 SERPL-SCNC: 33 MMOL/L — HIGH (ref 22–31)
CREAT SERPL-MCNC: 0.83 MG/DL — SIGNIFICANT CHANGE UP (ref 0.5–1.3)
GLUCOSE SERPL-MCNC: 103 MG/DL — HIGH (ref 70–99)
HCT VFR BLD CALC: 34.5 % — SIGNIFICANT CHANGE UP (ref 34.5–45)
HGB BLD-MCNC: 9.8 G/DL — LOW (ref 11.5–15.5)
INR BLD: 2.11 — HIGH (ref 0.88–1.16)
MAGNESIUM SERPL-MCNC: 2.1 MG/DL — SIGNIFICANT CHANGE UP (ref 1.6–2.6)
MCHC RBC-ENTMCNC: 24.3 PG — LOW (ref 27–34)
MCHC RBC-ENTMCNC: 28.4 GM/DL — LOW (ref 32–36)
MCV RBC AUTO: 85.6 FL — SIGNIFICANT CHANGE UP (ref 80–100)
NRBC # BLD: 0 /100 WBCS — SIGNIFICANT CHANGE UP (ref 0–0)
PLATELET # BLD AUTO: 196 K/UL — SIGNIFICANT CHANGE UP (ref 150–400)
POTASSIUM SERPL-MCNC: 3.9 MMOL/L — SIGNIFICANT CHANGE UP (ref 3.5–5.3)
POTASSIUM SERPL-SCNC: 3.9 MMOL/L — SIGNIFICANT CHANGE UP (ref 3.5–5.3)
PROTHROM AB SERPL-ACNC: 24.4 SEC — HIGH (ref 10–12.9)
RBC # BLD: 4.03 M/UL — SIGNIFICANT CHANGE UP (ref 3.8–5.2)
RBC # FLD: 16.8 % — HIGH (ref 10.3–14.5)
SODIUM SERPL-SCNC: 140 MMOL/L — SIGNIFICANT CHANGE UP (ref 135–145)
WBC # BLD: 7.26 K/UL — SIGNIFICANT CHANGE UP (ref 3.8–10.5)
WBC # FLD AUTO: 7.26 K/UL — SIGNIFICANT CHANGE UP (ref 3.8–10.5)

## 2019-04-23 PROCEDURE — 93312 ECHO TRANSESOPHAGEAL: CPT | Mod: 26

## 2019-04-23 PROCEDURE — 92960 CARDIOVERSION ELECTRIC EXT: CPT

## 2019-04-23 PROCEDURE — 93320 DOPPLER ECHO COMPLETE: CPT | Mod: 26

## 2019-04-23 PROCEDURE — 99232 SBSQ HOSP IP/OBS MODERATE 35: CPT

## 2019-04-23 PROCEDURE — 93325 DOPPLER ECHO COLOR FLOW MAPG: CPT | Mod: 26

## 2019-04-23 RX ADMIN — ANASTROZOLE 1 MILLIGRAM(S): 1 TABLET ORAL at 21:46

## 2019-04-23 RX ADMIN — Medication 81 MILLIGRAM(S): at 21:46

## 2019-04-23 RX ADMIN — Medication 40 MILLIGRAM(S): at 18:46

## 2019-04-23 RX ADMIN — RIVAROXABAN 15 MILLIGRAM(S): KIT at 18:45

## 2019-04-23 RX ADMIN — BISOPROLOL FUMARATE 5 MILLIGRAM(S): 10 TABLET, FILM COATED ORAL at 06:24

## 2019-04-23 RX ADMIN — Medication 40 MILLIGRAM(S): at 06:24

## 2019-04-23 RX ADMIN — Medication 1 APPLICATION(S): at 15:36

## 2019-04-23 RX ADMIN — SPIRONOLACTONE 12.5 MILLIGRAM(S): 25 TABLET, FILM COATED ORAL at 06:24

## 2019-04-23 NOTE — PROGRESS NOTE ADULT - SUBJECTIVE AND OBJECTIVE BOX
INTERVAL HISTORY:  for WILIAM and DC CV  	  MEDICATIONS:  bisoprolol   Tablet 5 milliGRAM(s) Oral daily  furosemide   Injectable 40 milliGRAM(s) IV Push two times a day  spironolactone 12.5 milliGRAM(s) Oral daily  anastrozole 1 milliGRAM(s) Oral daily  aspirin enteric coated 81 milliGRAM(s) Oral daily  rivaroxaban 15 milliGRAM(s) Oral daily  silver sulfADIAZINE 1% Cream 1 Application(s) Topical daily      PHYSICAL EXAM:  T(C): 37.1 (04-23-19 @ 05:22), Max: 37.1 (04-23-19 @ 05:22)  HR: 62 (04-23-19 @ 05:08) (60 - 66)  BP: 111/64 (04-23-19 @ 05:08) (94/54 - 128/59)  RR: 16 (04-23-19 @ 05:08) (12 - 16)  SpO2: 96% (04-23-19 @ 05:08) (96% - 97%)  Wt(kg): --  I&O's Summary    22 Apr 2019 07:01  -  23 Apr 2019 07:00  --------------------------------------------------------  IN: 540 mL / OUT: 700 mL / NET: -160 mL          Appearance: Normal	  HEENT:   Normal oral mucosa, PERRL, EOMI	  Lymphatic: No lymphadenopathy  Cardiovascular: Irregular, variable S1, [place], trace edema  Respiratory: Lungs clear to auscultation	  Psychiatry: A & O x 3, Mood & affect appropriate  Gastrointestinal:  Soft, Non-tender, + BS	  Skin: No rashes, No ecchymoses, No cyanosis  Neurologic: Non-focal  Extremities: Normal range of motion, No clubbing, cyanosis, trace edema  Vascular: Peripheral pulses palpable 2+ bilaterally    TELEMETRY: 	    ECG:  	  RADIOLOGY:   DIAGNOSTIC TESTING:  [ ] Echocardiogram:  [ ]  Catheterization:  [ ] Stress Test:    OTHER: 	    LABS:	 	    CARDIAC MARKERS:                                  9.8    7.26  )-----------( 196      ( 23 Apr 2019 07:17 )             34.5     04-22    143  |  102  |  27<H>  ----------------------------<  99  3.8   |  33<H>  |  0.84    Ca    8.4      22 Apr 2019 05:45  Mg     1.9     04-22      proBNP:   Lipid Profile:   HgA1c:   TSH:     ASSESSMENT/PLAN:

## 2019-04-23 NOTE — PROGRESS NOTE ADULT - SUBJECTIVE AND OBJECTIVE BOX
INTERVAL EVENTS: Patient seen in AM and post DCCV. Patient doing well and tolerated procedure well. No complaints.     PAST MEDICAL & SURGICAL HISTORY:  Basal cell carcinoma  HTN (hypertension)  Breast CA  CHF (congestive heart failure), NYHA class I  Afib  S/P TAVR (transcatheter aortic valve replacement)  Presence of pessary  History of lumpectomy      MEDICATIONS  (STANDING):  anastrozole 1 milliGRAM(s) Oral daily  aspirin enteric coated 81 milliGRAM(s) Oral daily  bisoprolol   Tablet 5 milliGRAM(s) Oral daily  furosemide   Injectable 40 milliGRAM(s) IV Push two times a day  rivaroxaban 15 milliGRAM(s) Oral daily  silver sulfADIAZINE 1% Cream 1 Application(s) Topical daily  spironolactone 12.5 milliGRAM(s) Oral daily    MEDICATIONS  (PRN):      Vital Signs Last 24 Hrs  T(C): 36.3 (23 Apr 2019 14:19), Max: 37.2 (23 Apr 2019 08:34)  T(F): 97.3 (23 Apr 2019 14:19), Max: 99 (23 Apr 2019 08:34)  HR: 52 (23 Apr 2019 15:04) (52 - 65)  BP: 90/55 (23 Apr 2019 15:04) (90/55 - 116/53)  BP(mean): 70 (23 Apr 2019 15:04) (70 - 78)  RR: 15 (23 Apr 2019 15:04) (15 - 16)  SpO2: 93% (23 Apr 2019 15:04) (93% - 96%)     PHYSICAL EXAM:  GEN: Awake, alert. NAD.   HEENT: NCAT, PERRL, EOMI. Mucosa moist. No JVD.  RESP: Decreased BS B/L at bases.   CV: RRR. Normal S1/S2. No m/r/g.  ABD: Soft. NT/ND. BS+  EXT: Warm. 1+ pitting edema in B/L LE.    NEURO: AAOx3. No focal deficits.     LABS:                        9.8    7.26  )-----------( 196      ( 23 Apr 2019 07:17 )             34.5     04-23    140  |  98  |  27<H>  ----------------------------<  103<H>  3.9   |  33<H>  |  0.83    Ca    9.0      23 Apr 2019 07:17  Mg     2.1     04-23          PT/INR - ( 23 Apr 2019 07:17 )   PT: 24.4 sec;   INR: 2.11          PTT - ( 23 Apr 2019 07:17 )  PTT:37.9 sec    I&O's Summary    22 Apr 2019 07:01  -  23 Apr 2019 07:00  --------------------------------------------------------  IN: 540 mL / OUT: 700 mL / NET: -160 mL    23 Apr 2019 07:01  -  23 Apr 2019 17:13  --------------------------------------------------------  IN: 120 mL / OUT: 0 mL / NET: 120 mL      BNP  RADIOLOGY & ADDITIONAL STUDIES:    TELEMETRY: NSR with HR 50-60s.     EKG:

## 2019-04-23 NOTE — PROGRESS NOTE ADULT - SUBJECTIVE AND OBJECTIVE BOX
Interventional Cardiology PA Adult Progress Note    CC:  Subjective Assessment:        ROS otherwise negative except as stated in HPI and subjective. 	  MEDICATIONS:  bisoprolol   Tablet 5 milliGRAM(s) Oral daily  furosemide   Injectable 40 milliGRAM(s) IV Push two times a day  spironolactone 12.5 milliGRAM(s) Oral daily  anastrozole 1 milliGRAM(s) Oral daily  aspirin enteric coated 81 milliGRAM(s) Oral daily  rivaroxaban 15 milliGRAM(s) Oral daily  silver sulfADIAZINE 1% Cream 1 Application(s) Topical daily    PHYSICAL EXAM:  TELEMETRY: atrial flutter and then SB with 1st degree AVB after DCCV  T(C): 37.2 (04-23-19 @ 08:34), Max: 37.2 (04-23-19 @ 08:34)  HR: 65 (04-23-19 @ 08:30) (60 - 65)  BP: 114/54 (04-23-19 @ 08:30) (94/54 - 116/53)  RR: 15 (04-23-19 @ 08:30) (14 - 16)  SpO2: 96% (04-23-19 @ 05:08) (96% - 97%)  Wt(kg): --  I&O's Summary    22 Apr 2019 07:01  -  23 Apr 2019 07:00  --------------------------------------------------------  IN: 540 mL / OUT: 700 mL / NET: -160 mL    [place]: none  Central/PICC/Mid Line: none                                         Appearance: Normal	  HEENT:   Normal oral mucosa, PERRL, EOMI	  Neck: Supple, - JVD  Cardiovascular: Normal S1 S2, No JVD, No murmurs,   Respiratory: Lungs clear to auscultation b/l, No Rales, Rhonchi, Wheezing	  Gastrointestinal:  Soft, Non-tender, + BS	  Skin: No rashes, No ecchymoses, No cyanosis  Extremities: Normal range of motion, No clubbing, cyanosis or edema  Vascular: Peripheral pulses palpable 2+ bilaterally  Neurologic: Non-focal  Psychiatry: A & O x 3, Mood & affect appropriate    LABS                      9.8    7.26  )-----------( 196      ( 23 Apr 2019 07:17 )             34.5     04-23    140  |  98  |  27<H>  ----------------------------<  103<H>  3.9   |  33<H>  |  0.83    Ca    9.0      23 Apr 2019 07:17  Mg     2.1     04-23  PT/INR - ( 23 Apr 2019 07:17 )   PT: 24.4 sec;   INR: 2.11       PTT - ( 23 Apr 2019 07:17 )  PTT:37.9 sec    ASSESSMENT/PLAN:

## 2019-04-23 NOTE — PROGRESS NOTE ADULT - PROBLEM SELECTOR PLAN 2
in afib, rate controlled HR 50-60s  - continue home Xarelto 15mg QD (prescribed as O/P 2mo ago but did not start taking until this hospital stay)  - Continue bisoprolol 5mg PO QD  - EP (Dr Marmolejo) consulted and pt is now s/p WILIAM/DCCV on 4/23/19 with successful conversion to SB 50s with 1st degree AVB.

## 2019-04-24 ENCOUNTER — TRANSCRIPTION ENCOUNTER (OUTPATIENT)
Age: 84
End: 2019-04-24

## 2019-04-24 LAB
ALBUMIN SERPL ELPH-MCNC: 3.4 G/DL — SIGNIFICANT CHANGE UP (ref 3.3–5)
ALP SERPL-CCNC: 179 U/L — HIGH (ref 40–120)
ALT FLD-CCNC: 16 U/L — SIGNIFICANT CHANGE UP (ref 10–45)
ANION GAP SERPL CALC-SCNC: 10 MMOL/L — SIGNIFICANT CHANGE UP (ref 5–17)
AST SERPL-CCNC: 21 U/L — SIGNIFICANT CHANGE UP (ref 10–40)
BILIRUB SERPL-MCNC: 0.7 MG/DL — SIGNIFICANT CHANGE UP (ref 0.2–1.2)
BUN SERPL-MCNC: 32 MG/DL — HIGH (ref 7–23)
CALCIUM SERPL-MCNC: 9.3 MG/DL — SIGNIFICANT CHANGE UP (ref 8.4–10.5)
CHLORIDE SERPL-SCNC: 99 MMOL/L — SIGNIFICANT CHANGE UP (ref 96–108)
CO2 SERPL-SCNC: 33 MMOL/L — HIGH (ref 22–31)
CREAT SERPL-MCNC: 0.9 MG/DL — SIGNIFICANT CHANGE UP (ref 0.5–1.3)
GLUCOSE SERPL-MCNC: 129 MG/DL — HIGH (ref 70–99)
HCT VFR BLD CALC: 35.7 % — SIGNIFICANT CHANGE UP (ref 34.5–45)
HGB BLD-MCNC: 10.1 G/DL — LOW (ref 11.5–15.5)
MAGNESIUM SERPL-MCNC: 2.1 MG/DL — SIGNIFICANT CHANGE UP (ref 1.6–2.6)
MCHC RBC-ENTMCNC: 24.2 PG — LOW (ref 27–34)
MCHC RBC-ENTMCNC: 28.3 GM/DL — LOW (ref 32–36)
MCV RBC AUTO: 85.6 FL — SIGNIFICANT CHANGE UP (ref 80–100)
NRBC # BLD: 0 /100 WBCS — SIGNIFICANT CHANGE UP (ref 0–0)
PLATELET # BLD AUTO: 220 K/UL — SIGNIFICANT CHANGE UP (ref 150–400)
POTASSIUM SERPL-MCNC: 4 MMOL/L — SIGNIFICANT CHANGE UP (ref 3.5–5.3)
POTASSIUM SERPL-SCNC: 4 MMOL/L — SIGNIFICANT CHANGE UP (ref 3.5–5.3)
PROT SERPL-MCNC: 8.3 G/DL — SIGNIFICANT CHANGE UP (ref 6–8.3)
RBC # BLD: 4.17 M/UL — SIGNIFICANT CHANGE UP (ref 3.8–5.2)
RBC # FLD: 16.8 % — HIGH (ref 10.3–14.5)
SODIUM SERPL-SCNC: 142 MMOL/L — SIGNIFICANT CHANGE UP (ref 135–145)
WBC # BLD: 8.23 K/UL — SIGNIFICANT CHANGE UP (ref 3.8–10.5)
WBC # FLD AUTO: 8.23 K/UL — SIGNIFICANT CHANGE UP (ref 3.8–10.5)

## 2019-04-24 PROCEDURE — 99232 SBSQ HOSP IP/OBS MODERATE 35: CPT

## 2019-04-24 RX ORDER — FUROSEMIDE 40 MG
40 TABLET ORAL DAILY
Qty: 0 | Refills: 0 | Status: DISCONTINUED | OUTPATIENT
Start: 2019-04-25 | End: 2019-04-25

## 2019-04-24 RX ORDER — MULTIVIT-MIN/FERROUS GLUCONATE 9 MG/15 ML
1 LIQUID (ML) ORAL DAILY
Qty: 0 | Refills: 0 | Status: DISCONTINUED | OUTPATIENT
Start: 2019-04-24 | End: 2019-04-25

## 2019-04-24 RX ADMIN — Medication 40 MILLIGRAM(S): at 06:07

## 2019-04-24 RX ADMIN — Medication 81 MILLIGRAM(S): at 21:20

## 2019-04-24 RX ADMIN — RIVAROXABAN 15 MILLIGRAM(S): KIT at 17:56

## 2019-04-24 RX ADMIN — Medication 1 APPLICATION(S): at 12:36

## 2019-04-24 RX ADMIN — ANASTROZOLE 1 MILLIGRAM(S): 1 TABLET ORAL at 21:20

## 2019-04-24 RX ADMIN — SPIRONOLACTONE 12.5 MILLIGRAM(S): 25 TABLET, FILM COATED ORAL at 06:07

## 2019-04-24 RX ADMIN — BISOPROLOL FUMARATE 5 MILLIGRAM(S): 10 TABLET, FILM COATED ORAL at 12:35

## 2019-04-24 NOTE — DISCHARGE NOTE NURSING/CASE MANAGEMENT/SOCIAL WORK - NSSCNAMETXT_GEN_ALL_CORE
[facility] (formerly [facility] - [facility])

## 2019-04-24 NOTE — PROGRESS NOTE ADULT - PROVIDER SPECIALTY LIST ADULT
Cardiology
Electrophysiology
Intervent Cardiology
Cardiology

## 2019-04-24 NOTE — DIETITIAN INITIAL EVALUATION ADULT. - ENERGY NEEDS
Height: 5'3" Weight: 117.5 lbs, 114.1 lbs (4/20), 111.5 lbs (4/21), 108.4 lbs (4/22), 104.7 lbs (4/23), 102.9 lbs (4/24),  lbs+/-10%, %IBW 89%, BMI 20.8,   ABW used to calculate EER as per pt's current body weight is within % IBW   Estimated nutrient needs based on [facility] SOC for maintenance in older adults adjusted for malnutrition   FR 1000 mL/d per team 2/2 CHF

## 2019-04-24 NOTE — DIETITIAN INITIAL EVALUATION ADULT. - NS AS NUTRI INTERV MEALS SNACK
Liberalize diet to Low Na, FR 1000 mL/d Kosher + Ensure Pudding 4 oz PO BID (340 kcal, 8 gm protein, Ensure Enlive 8 oz PO daily (350 kcal, 20 gm protein)

## 2019-04-24 NOTE — PROGRESS NOTE ADULT - PROBLEM SELECTOR PROBLEM 2
Afib
Persistent atrial fibrillation
Afib

## 2019-04-24 NOTE — PROGRESS NOTE ADULT - PROBLEM SELECTOR PLAN 2
s/p WILIAM/DCCV 4/23/19, SB 50's with 1st degree AV block   - continue home Xarelto 15mg QD (prescribed as O/P 2mo ago but did not start taking until this hospital stay)  - Continue bisoprolol 5mg PO QD

## 2019-04-24 NOTE — PROGRESS NOTE ADULT - PROBLEM SELECTOR PLAN 3
BP stable  - continue home bisoprolol 5mg and spironolactone 12.5mg    VTE ppx: on xarelto  Dispo: pending further diuresis and EP consult.   PT recs home w/ home PT and home care  - case discussed with Dr. Cueva
BP good
BP stable  - continue home bisoprolol 5mg and spironolactone 12.5mg      VTE ppx: on xarelto  Dispo: pending further diuresis and EP consult for DCCV, PT consulted   Case d/w Dr. Cook (covering for Dr. Cueva)
BP stable  - continue home bisoprolol 5mg and spironolactone 12.5mg    VTE ppx: on xarelto  Dispo: pending further diuresis and EP consult.   PT recs home w/ home PT and home care  - case discussed with Dr. Cueva
BP stable  -continue home bisoprolol 5mg and spironolactone 12.5mg      VTE ppx: on xarelto  Dispo: pending further diuresis and EP consult for DCCV, PT consulted     Case d/w Dr. Cook
BP stable  - continue home bisoprolol 5mg and spironolactone 12.5mg    VTE ppx: on xarelto  Dispo: Transition to PO lasix tomorrow with planned discharge tomorrow   PT recs home w/ home PT and home care  - case discussed with Dr. Cueva

## 2019-04-24 NOTE — PROGRESS NOTE ADULT - PROBLEM SELECTOR PROBLEM 1
Acute systolic congestive heart failure

## 2019-04-24 NOTE — PROGRESS NOTE ADULT - PROBLEM SELECTOR PROBLEM 3
HTN (hypertension)
Essential hypertension
HTN (hypertension)

## 2019-04-24 NOTE — PROGRESS NOTE ADULT - SUBJECTIVE AND OBJECTIVE BOX
INTERVAL HISTORY:  s/p WILIAM and DC CV  	  MEDICATIONS:  bisoprolol   Tablet 5 milliGRAM(s) Oral daily  furosemide   Injectable 40 milliGRAM(s) IV Push two times a day  spironolactone 12.5 milliGRAM(s) Oral daily  anastrozole 1 milliGRAM(s) Oral daily  aspirin enteric coated 81 milliGRAM(s) Oral daily  rivaroxaban 15 milliGRAM(s) Oral daily  silver sulfADIAZINE 1% Cream 1 Application(s) Topical daily        PHYSICAL EXAM:  T(C): 37.2 (04-24-19 @ 06:45), Max: 37.2 (04-24-19 @ 06:45)  HR: 61 (04-24-19 @ 08:46) (52 - 64)  BP: 118/56 (04-24-19 @ 08:46) (90/55 - 128/60)  RR: 18 (04-24-19 @ 08:46) (15 - 18)  SpO2: 95% (04-24-19 @ 08:46) (93% - 96%)  Wt(kg): --  I&O's Summary    23 Apr 2019 07:01  -  24 Apr 2019 07:00  --------------------------------------------------------  IN: 480 mL / OUT: 350 mL / NET: 130 mL          Appearance: Normal	  HEENT:   Normal oral mucosa, PERRL, EOMI	  Lymphatic: No lymphadenopathy  Cardiovascular: Normal S1 S2, No JVD, SALVADOR, trace edema  Respiratory: Lungs clear to auscultation	  Psychiatry: A & O x 3, Mood & affect appropriate  Gastrointestinal:  Soft, Non-tender, + BS	  Skin: No rashes, No ecchymoses, No cyanosis  Neurologic: Non-focal  Extremities: Normal range of motion, No clubbing, cyanosis, trace edema  Vascular: Peripheral pulses palpable 2+ bilaterally    TELEMETRY: 	  NSR  ECG:  	  RADIOLOGY:   DIAGNOSTIC TESTING:  [ ] Echocardiogram:  [ ]  Catheterization:  [ ] Stress Test:    OTHER: 	    LABS:	 	    CARDIAC MARKERS:                                  10.1   8.23  )-----------( 220      ( 24 Apr 2019 07:36 )             35.7     04-24    142  |  99  |  32<H>  ----------------------------<  129<H>  4.0   |  33<H>  |  0.90    Ca    9.3      24 Apr 2019 07:36  Mg     2.1     04-24    TPro  8.3  /  Alb  3.4  /  TBili  0.7  /  DBili  x   /  AST  21  /  ALT  16  /  AlkPhos  179<H>  04-24    proBNP:   Lipid Profile:   HgA1c:   TSH:     ASSESSMENT/PLAN:

## 2019-04-24 NOTE — PROGRESS NOTE ADULT - PROBLEM SELECTOR PLAN 1
feeling weak.  faint crackles b/l, + JVD, 2+ pitting edema. BNP 3964.   - ECHO 04/20/19 EF 77%, mild LVH, LV diastolic dysfunction, severely dilated RA, mildly dilated LA, TAVR normal functioning, severe TR, severe pulm HTN PASP at least 69mmHg  - CXR: Right pleural effusion.   - continue Lasix 40mg IVP BID  - hold home ethacrynic acid 25mg  - Continue daily weights, strict I&Os, core measures, fluid restriction 1L
Most likely precipitated by A Fib
Most likely precipitated by A Fib
Most likely precipitated by A Fib.  Change IV Lasix to PO, home tomorrow
feeling weak.  faint crackles b/l, + JVD, 2+ pitting edmea. BNP 3964.   - ECHO 04/20 EF 77%, mild LVH, LV diastolic dysfunction, severely dilated RA, mildly dilated LA, TAVR normal functioning, severe TR, severe pulm HTN PASP at least 69mmHg  - CXR: Right pleural effusion.   - continue Lasix 40mg IVP BID  - hold home ethacrynic acid 25mg  - Continue daily weights, strict I&Os, core measures, fluid restriction 1L
feeling weak.  faint crackles b/l, + JVD, 2+ pitting edmea. BNP 3964.   - ECHO 04/20 EF 77%, mild LVH, LV diastolic dysfunction, severely dilated RA, mildly dilated LA, TAVR normal functioning, severe TR, severe pulm HTN PASP at least 69mmHg  - CXR: Right pleural effusion.   - continue Lasix 40mg IVP BID  - hold home ethacrynic acid 25mg  - Continue daily weights, strict I&Os, core measures, fluid restriction 1L
Worsening SOB and LE edema, 2+ pitting up to mid calfs B/L, rales in R & L bases, BNP 3964, s/p Lasix 40mg IVP x 1 in ED  -ECHO 04/20 EF 77%, mild LVH, LV diastolic dysfunction, severely dilated RA, mildly dilated LA, TAVR normal functioning, severe TR, severe pulm HTN PASP at least 69mmHg  -CXR wet read: R pleural effusion with congestion, f/u official read  -trop neg x 3  -continue Lasix 40mg IVP BID  -hold home ethacrynic acid 25mg  -daily weights, strict I&Os, core measures, fluid restriction 1L
1+ edema. BNP 3964.   - ECHO 04/20/19 EF 77%, mild LVH, LV diastolic dysfunction, severely dilated RA, mildly dilated LA, TAVR normal functioning, severe TR, severe pulm HTN PASP at least 69mmHg  - CXR: Right pleural effusion.   - Transitioned Lasix 40mg IV QD to PO lasix 40 mg PO starting tomorrow   - hold home ethacrynic acid 25mg  - Continue daily weights, strict I&Os, core measures, fluid restriction 1L

## 2019-04-24 NOTE — DISCHARGE NOTE NURSING/CASE MANAGEMENT/SOCIAL WORK - NSDCDPATPORTLINK_GEN_ALL_CORE
You can access the [facility] Patient Portal, offered by [facility], by registering with the following website: [email or web address]

## 2019-04-24 NOTE — DIETITIAN INITIAL EVALUATION ADULT. - OTHER INFO
89 y.o F from home with PMHx of HTN, AS s/p TAVR, Breast CA s/p lumpectomy, Basel cell carcinoma s/p excision, CHF and Afib on Xarelto. Pt admitted for acute CHF exacerbation in setting of Afib, underwent WILIAM/DCCV 4/23. Pt reports cooking for self at home, eats poor to fairly, eats Kosher, sometimes drink Ensure at home, NKFA. Pt admits to unintentional weight loss of 7 lbs,  lbs. NFPE with positive findings of muscle deficits and fat loss. Severe PCMalnutrition in context of chronic illness is identified and suspected. Pt takes ethacrynic acid per home meds. Pt able to tolerate DASH/TLC diet, FR 1000 mL/d Kosher with poor <45% PO intake of meals. Pt does not wish to eat anything at this time. Denies N/V/D/C or pain. +BM 4/24. Skin intact. In light of PCMalnutrition, see nutrition recs below. Please continue PO intake of meals. RD will f/u per high risk protocol.

## 2019-04-24 NOTE — DIETITIAN INITIAL EVALUATION ADULT. - PROBLEM SELECTOR PLAN 1
Worsening SOB and LE edema, 2+ pitting up to mid calfs B/L, rales in R & L bases, BNP 3964, s/p Lasix 40mg IVP x 1 in ED  -ECHO in AM  -CXR wet read: R pleural effusion with congestion, f/u official read  -trop neg x 1, follow up trop @ 8 and midnight  -continue Lasix 40mg IVP BID  -hold home ethacrynic acid 25mg  -daily weights, strict I&Os, core measures

## 2019-04-24 NOTE — PROGRESS NOTE ADULT - REASON FOR ADMISSION
CHF exacerbation

## 2019-04-24 NOTE — CHART NOTE - NSCHARTNOTEFT_GEN_A_CORE
Upon Nutritional Assessment by the Registered Dietitian your patient was determined to meet criteria / has evidence of the following diagnosis/diagnoses:          [ ]  Mild Protein Calorie Malnutrition        [ ]  Moderate Protein Calorie Malnutrition        [X] Severe Protein Calorie Malnutrition        [ ] Unspecified Protein Calorie Malnutrition        [ ] Underweight / BMI <19        [ ] Morbid Obesity / BMI > 40      Findings as based on:  •  Comprehensive nutrition assessment and consultation  •  Food acceptance and intake status from observations by RD  •   concerns    Per physical assessment: Muscle Wasting- Temporal [  X ]  Clavicle/Pectoral [ X ]  Shoulder/Deltoid [ X ]  Scapula [   ]  Interosseous [   ]  Quadriceps [   ]  Gastrocnemius [   ]; Fat Wasting- Orbital [ X ]  Buccal [ X ]  Triceps [   ]  Rib [   ]--> Suspect [PCM] 2/2 to physical assessment, [poor intake], and [wt loss]; please see malnutrition chart note.     Treatment:    The following diet has been recommended:  Refer to initial nutrition assessment note    PROVIDER Section:     By signing this assessment you are acknowledging and agree with the diagnosis/diagnoses assigned by the Registered Dietitian    Walter Shepherd RDN, CDN, CNSC   Ext: [phone] or available via [employer]

## 2019-04-24 NOTE — PROGRESS NOTE ADULT - ASSESSMENT
89yo F with PMHx of HTN, AS s/p TAVR (2018 at [facility] with Dr. Pantoja), Breast CA s/p lumpectomy 2014, Basal cell carcinoma s/p excision 3/2019, CHF and new afib (on Xarelto) who is admitted to [facility] for acute CHF exacerbation in setting of afib for further management. Continue diuresis and EP consult for possible DCCV
89yo F with PMHx of HTN, AS s/p TAVR (2018 at [facility] with Dr. Pantoja), Breast CA s/p lumpectomy 2014, Basal cell carcinoma s/p excision 3/2019, CHF and new afib (on Xarelto) who is [facility] for acute CHF exacerbation in setting of afib for further management. Continue diuresis and EP consult for possible DCCV
89yo F with PMHx of HTN, AS s/p TAVR (2018 at [facility] with Dr. Pantoja), Breast CA s/p lumpectomy 2014, Basal cell carcinoma s/p excision 3/2019, CHF and new afib (on Xarelto) who is admitted to [facility] for acute CHF exacerbation in setting of afib for further management. Continue diuresis and EP consult for possible DCCV
89yo F with PMHx of HTN, AS s/p TAVR (2018 at [facility] with Dr. Pantoja), Breast CA s/p lumpectomy 2014, Basal cell carcinoma s/p excision 3/2019, HFpEF and Atrial Fibrillation not on Xarelto prior to current hospitalization, currently admitted for HF exacerbation. WILIAM with no sign of LA/TRAM thrombus and now post DCCV with successful conversion to NSR. [id number].     -Currently in NSR.   -Continue with Bisoprolol 5mg. Hold if patient HR <60.   -Continue with Xarelto 15mg daily.  -Daily EKGs.  -CHF management per primary team.
89yo F with PMHx of HTN, AS s/p TAVR (2018 at [facility] with Dr. Pantoja), Breast CA s/p lumpectomy 2014, Basal cell carcinoma s/p excision 3/2019, CHF and new afib (on Xarelto) who is admitted to [facility] for acute CHF exacerbation in setting of afib for further management. Continue diuresis and EP consulted and now pt is s/p WILIAM and successful DCCV 4/23. Pt transitioning to PO lasix tomorrow and planned for discharge tomorrow
89yo F with PMHx of HTN, AS s/p TAVR (2018 at [facility] with Dr. Pantoja), Breast CA s/p lumpectomy 2014, Basal cell carcinoma s/p excision 3/2019, CHF and new afib (on Xarelto) who is admitted to [facility] for acute CHF exacerbation in setting of afib for further management. Continue diuresis and EP consulted and now pt is s/p WILIAM and successful DCCV. Pt is now in SB 50s with 1st degree AVB. .

## 2019-04-25 VITALS — TEMPERATURE: 98 F

## 2019-04-25 LAB
ANION GAP SERPL CALC-SCNC: 10 MMOL/L — SIGNIFICANT CHANGE UP (ref 5–17)
BUN SERPL-MCNC: 34 MG/DL — HIGH (ref 7–23)
CALCIUM SERPL-MCNC: 9.1 MG/DL — SIGNIFICANT CHANGE UP (ref 8.4–10.5)
CHLORIDE SERPL-SCNC: 99 MMOL/L — SIGNIFICANT CHANGE UP (ref 96–108)
CO2 SERPL-SCNC: 33 MMOL/L — HIGH (ref 22–31)
CREAT SERPL-MCNC: 0.83 MG/DL — SIGNIFICANT CHANGE UP (ref 0.5–1.3)
GLUCOSE SERPL-MCNC: 108 MG/DL — HIGH (ref 70–99)
HCT VFR BLD CALC: 33.5 % — LOW (ref 34.5–45)
HGB BLD-MCNC: 9.6 G/DL — LOW (ref 11.5–15.5)
MAGNESIUM SERPL-MCNC: 2.2 MG/DL — SIGNIFICANT CHANGE UP (ref 1.6–2.6)
MCHC RBC-ENTMCNC: 24.5 PG — LOW (ref 27–34)
MCHC RBC-ENTMCNC: 28.7 GM/DL — LOW (ref 32–36)
MCV RBC AUTO: 85.5 FL — SIGNIFICANT CHANGE UP (ref 80–100)
NRBC # BLD: 0 /100 WBCS — SIGNIFICANT CHANGE UP (ref 0–0)
PLATELET # BLD AUTO: 202 K/UL — SIGNIFICANT CHANGE UP (ref 150–400)
POTASSIUM SERPL-MCNC: 3.7 MMOL/L — SIGNIFICANT CHANGE UP (ref 3.5–5.3)
POTASSIUM SERPL-SCNC: 3.7 MMOL/L — SIGNIFICANT CHANGE UP (ref 3.5–5.3)
RBC # BLD: 3.92 M/UL — SIGNIFICANT CHANGE UP (ref 3.8–5.2)
RBC # FLD: 16.8 % — HIGH (ref 10.3–14.5)
SODIUM SERPL-SCNC: 142 MMOL/L — SIGNIFICANT CHANGE UP (ref 135–145)
WBC # BLD: 7.8 K/UL — SIGNIFICANT CHANGE UP (ref 3.8–10.5)
WBC # FLD AUTO: 7.8 K/UL — SIGNIFICANT CHANGE UP (ref 3.8–10.5)

## 2019-04-25 PROCEDURE — 99239 HOSP IP/OBS DSCHRG MGMT >30: CPT

## 2019-04-25 PROCEDURE — 85610 PROTHROMBIN TIME: CPT

## 2019-04-25 PROCEDURE — 83880 ASSAY OF NATRIURETIC PEPTIDE: CPT

## 2019-04-25 PROCEDURE — 96374 THER/PROPH/DIAG INJ IV PUSH: CPT

## 2019-04-25 PROCEDURE — 71045 X-RAY EXAM CHEST 1 VIEW: CPT

## 2019-04-25 PROCEDURE — 84132 ASSAY OF SERUM POTASSIUM: CPT

## 2019-04-25 PROCEDURE — 80061 LIPID PANEL: CPT

## 2019-04-25 PROCEDURE — 82803 BLOOD GASES ANY COMBINATION: CPT

## 2019-04-25 PROCEDURE — 97116 GAIT TRAINING THERAPY: CPT

## 2019-04-25 PROCEDURE — 84484 ASSAY OF TROPONIN QUANT: CPT

## 2019-04-25 PROCEDURE — 97161 PT EVAL LOW COMPLEX 20 MIN: CPT

## 2019-04-25 PROCEDURE — 93312 ECHO TRANSESOPHAGEAL: CPT

## 2019-04-25 PROCEDURE — 85027 COMPLETE CBC AUTOMATED: CPT

## 2019-04-25 PROCEDURE — 84443 ASSAY THYROID STIM HORMONE: CPT

## 2019-04-25 PROCEDURE — 99285 EMERGENCY DEPT VISIT HI MDM: CPT | Mod: 25

## 2019-04-25 PROCEDURE — 80048 BASIC METABOLIC PNL TOTAL CA: CPT

## 2019-04-25 PROCEDURE — 80053 COMPREHEN METABOLIC PANEL: CPT

## 2019-04-25 PROCEDURE — 93005 ELECTROCARDIOGRAM TRACING: CPT

## 2019-04-25 PROCEDURE — 83735 ASSAY OF MAGNESIUM: CPT

## 2019-04-25 PROCEDURE — 97530 THERAPEUTIC ACTIVITIES: CPT

## 2019-04-25 PROCEDURE — 82553 CREATINE MB FRACTION: CPT

## 2019-04-25 PROCEDURE — 82330 ASSAY OF CALCIUM: CPT

## 2019-04-25 PROCEDURE — 85730 THROMBOPLASTIN TIME PARTIAL: CPT

## 2019-04-25 PROCEDURE — 85025 COMPLETE CBC W/AUTO DIFF WBC: CPT

## 2019-04-25 PROCEDURE — 82550 ASSAY OF CK (CPK): CPT

## 2019-04-25 PROCEDURE — 84436 ASSAY OF TOTAL THYROXINE: CPT

## 2019-04-25 PROCEDURE — 93306 TTE W/DOPPLER COMPLETE: CPT

## 2019-04-25 PROCEDURE — 83036 HEMOGLOBIN GLYCOSYLATED A1C: CPT

## 2019-04-25 PROCEDURE — 80076 HEPATIC FUNCTION PANEL: CPT

## 2019-04-25 PROCEDURE — 84295 ASSAY OF SERUM SODIUM: CPT

## 2019-04-25 PROCEDURE — 36415 COLL VENOUS BLD VENIPUNCTURE: CPT

## 2019-04-25 RX ORDER — BISOPROLOL FUMARATE 10 MG/1
1 TABLET, FILM COATED ORAL
Qty: 0 | Refills: 0 | COMMUNITY

## 2019-04-25 RX ORDER — FONDAPARINUX SODIUM 2.5 MG/.5ML
1 INJECTION, SOLUTION SUBCUTANEOUS
Qty: 30 | Refills: 0 | OUTPATIENT
Start: 2019-04-25 | End: 2019-05-24

## 2019-04-25 RX ORDER — ETHACRYNIC ACID 25 MG/1
0 TABLET ORAL
Qty: 0 | Refills: 0 | COMMUNITY

## 2019-04-25 RX ORDER — POTASSIUM CHLORIDE 20 MEQ
20 PACKET (EA) ORAL ONCE
Qty: 0 | Refills: 0 | Status: COMPLETED | OUTPATIENT
Start: 2019-04-25 | End: 2019-04-25

## 2019-04-25 RX ORDER — MULTIVIT-MIN/FERROUS GLUCONATE 9 MG/15 ML
1 LIQUID (ML) ORAL
Qty: 30 | Refills: 2 | OUTPATIENT
Start: 2019-04-25 | End: 2019-07-23

## 2019-04-25 RX ORDER — BISOPROLOL FUMARATE 10 MG/1
0.5 TABLET, FILM COATED ORAL
Qty: 15 | Refills: 2 | OUTPATIENT
Start: 2019-04-25 | End: 2019-07-23

## 2019-04-25 RX ORDER — FUROSEMIDE 40 MG
1 TABLET ORAL
Qty: 30 | Refills: 2 | OUTPATIENT
Start: 2019-04-25 | End: 2019-07-23

## 2019-04-25 RX ADMIN — Medication 1 TABLET(S): at 12:48

## 2019-04-25 RX ADMIN — SPIRONOLACTONE 12.5 MILLIGRAM(S): 25 TABLET, FILM COATED ORAL at 09:34

## 2019-04-25 RX ADMIN — Medication 40 MILLIGRAM(S): at 05:48

## 2019-04-25 RX ADMIN — BISOPROLOL FUMARATE 5 MILLIGRAM(S): 10 TABLET, FILM COATED ORAL at 06:56

## 2019-04-25 RX ADMIN — Medication 20 MILLIEQUIVALENT(S): at 09:35

## 2019-04-29 PROBLEM — C50.919 MALIGNANT NEOPLASM OF UNSPECIFIED SITE OF UNSPECIFIED FEMALE BREAST: Chronic | Status: ACTIVE | Noted: 2019-04-19

## 2019-04-29 PROBLEM — I50.9 HEART FAILURE, UNSPECIFIED: Chronic | Status: ACTIVE | Noted: 2019-04-19

## 2019-04-29 PROBLEM — I10 ESSENTIAL (PRIMARY) HYPERTENSION: Chronic | Status: ACTIVE | Noted: 2019-04-19

## 2019-04-29 PROBLEM — I48.91 UNSPECIFIED ATRIAL FIBRILLATION: Chronic | Status: ACTIVE | Noted: 2019-04-19

## 2019-04-29 PROBLEM — C44.91 BASAL CELL CARCINOMA OF SKIN, UNSPECIFIED: Chronic | Status: ACTIVE | Noted: 2019-04-19

## 2019-05-01 ENCOUNTER — APPOINTMENT (OUTPATIENT)
Age: 84
End: 2019-05-01

## 2019-05-06 DIAGNOSIS — I11.0 HYPERTENSIVE HEART DISEASE WITH HEART FAILURE: ICD-10-CM

## 2019-05-06 DIAGNOSIS — F17.210 NICOTINE DEPENDENCE, CIGARETTES, UNCOMPLICATED: ICD-10-CM

## 2019-05-06 DIAGNOSIS — Z79.82 LONG TERM (CURRENT) USE OF ASPIRIN: ICD-10-CM

## 2019-05-06 DIAGNOSIS — Z95.3 PRESENCE OF XENOGENIC HEART VALVE: ICD-10-CM

## 2019-05-06 DIAGNOSIS — C50.919 MALIGNANT NEOPLASM OF UNSPECIFIED SITE OF UNSPECIFIED FEMALE BREAST: ICD-10-CM

## 2019-05-06 DIAGNOSIS — E43 UNSPECIFIED SEVERE PROTEIN-CALORIE MALNUTRITION: ICD-10-CM

## 2019-05-06 DIAGNOSIS — I50.23 ACUTE ON CHRONIC SYSTOLIC (CONGESTIVE) HEART FAILURE: ICD-10-CM

## 2019-05-06 DIAGNOSIS — I48.1 PERSISTENT ATRIAL FIBRILLATION: ICD-10-CM

## 2019-05-06 DIAGNOSIS — I08.1 RHEUMATIC DISORDERS OF BOTH MITRAL AND TRICUSPID VALVES: ICD-10-CM

## 2019-05-06 DIAGNOSIS — Z85.828 PERSONAL HISTORY OF OTHER MALIGNANT NEOPLASM OF SKIN: ICD-10-CM

## 2019-05-30 ENCOUNTER — MOBILE ON CALL (OUTPATIENT)
Age: 84
End: 2019-05-30

## 2019-07-12 NOTE — PROGRESS NOTE ADULT - SUBJECTIVE AND OBJECTIVE BOX
Interventional Cardiology PA Adult Progress Note    Subjective Assessment: Pt seen and examined at bedside this am with daughter present at bedside.   	  MEDICATIONS:  bisoprolol   Tablet 5 milliGRAM(s) Oral daily  spironolactone 12.5 milliGRAM(s) Oral daily              anastrozole 1 milliGRAM(s) Oral daily  aspirin enteric coated 81 milliGRAM(s) Oral daily  rivaroxaban 15 milliGRAM(s) Oral daily  silver sulfADIAZINE 1% Cream 1 Application(s) Topical daily      	    [PHYSICAL EXAM:  TELEMETRY:  T(C): 36.4 (04-24-19 @ 13:52), Max: 37.2 (04-24-19 @ 06:45)  HR: 53 (04-24-19 @ 12:34) (51 - 64)  BP: 113/57 (04-24-19 @ 12:34) (90/55 - 128/60)  RR: 18 (04-24-19 @ 12:34) (15 - 18)  SpO2: 95% (04-24-19 @ 12:34) (93% - 96%)  Wt(kg): --  I&O's Summary    23 Apr 2019 07:01  -  24 Apr 2019 07:00  --------------------------------------------------------  IN: 480 mL / OUT: 350 mL / NET: 130 mL    24 Apr 2019 07:01  -  24 Apr 2019 14:19  --------------------------------------------------------  IN: 400 mL / OUT: 400 mL / NET: 0 mL        [place]ade:  Central/PICC/Mid Line:                                         Appearance: Normal	  HEENT:   Normal oral mucosa, PERRL, EOMI	  Neck: Supple, + JVD/ - JVD; Carotid Bruit   Cardiovascular: Normal S1 S2, No JVD, No murmurs,   Respiratory: Lungs clear to auscultation/Decreased Breath Sounds/No Rales, Rhonchi, Wheezing	  Gastrointestinal:  Soft, Non-tender, + BS	  Skin: No rashes, No ecchymoses, No cyanosis  Extremities: Normal range of motion, No clubbing, cyanosis or edema  Vascular: Peripheral pulses palpable 2+ bilaterally  Neurologic: Non-focal  Psychiatry: A & O x 3, Mood & affect appropriate      	    ECG:  	  RADIOLOGY:   DIAGNOSTIC TESTING:  [ ] Echocardiogram:  [ ]  Catheterization:  [ ] Stress Test:    [ ] WILIAM  OTHER: 	    LABS:	 	  CARDIAC MARKERS:                                  10.1   8.23  )-----------( 220      ( 24 Apr 2019 07:36 )             35.7     04-24    142  |  99  |  32<H>  ----------------------------<  129<H>  4.0   |  33<H>  |  0.90    Ca    9.3      24 Apr 2019 07:36  Mg     2.1     04-24    TPro  8.3  /  Alb  3.4  /  TBili  0.7  /  DBili  x   /  AST  21  /  ALT  16  /  AlkPhos  179<H>  04-24    proBNP:   Lipid Profile:   HgA1c:   TSH:   PT/INR - ( 23 Apr 2019 07:17 )   PT: 24.4 sec;   INR: 2.11          PTT - ( 23 Apr 2019 07:17 )  PTT:37.9 sec    ASSESSMENT/PLAN: 	        DVT ppx:  Dispo: Interventional Cardiology PA Adult Progress Note    Subjective Assessment: Pt seen and examined at bedside this am with daughter present at bedside.  No complaints at this time.    ROS negative except for subjective and HPI   	  MEDICATIONS:  bisoprolol   Tablet 5 milliGRAM(s) Oral daily  spironolactone 12.5 milliGRAM(s) Oral daily              anastrozole 1 milliGRAM(s) Oral daily  aspirin enteric coated 81 milliGRAM(s) Oral daily  rivaroxaban 15 milliGRAM(s) Oral daily  silver sulfADIAZINE 1% Cream 1 Application(s) Topical daily      	    [PHYSICAL EXAM:  TELEMETRY:  T(C): 36.4 (04-24-19 @ 13:52), Max: 37.2 (04-24-19 @ 06:45)  HR: 53 (04-24-19 @ 12:34) (51 - 64)  BP: 113/57 (04-24-19 @ 12:34) (90/55 - 128/60)  RR: 18 (04-24-19 @ 12:34) (15 - 18)  SpO2: 95% (04-24-19 @ 12:34) (93% - 96%)  Wt(kg): --  I&O's Summary    23 Apr 2019 07:01  -  24 Apr 2019 07:00  --------------------------------------------------------  IN: 480 mL / OUT: 350 mL / NET: 130 mL    24 Apr 2019 07:01  -  24 Apr 2019 14:19  --------------------------------------------------------  IN: 400 mL / OUT: 400 mL / NET: 0 mL        Meade:  Central/PICC/Mid Line:                                         Appearance: Normal	  HEENT:   Normal oral mucosa, PERRL, EOMI	  Neck: Supple, - JVD;   Cardiovascular: Normal S1 S2, No JVD, No murmurs  Respiratory: Lungs clear to auscultation, No Rales, Rhonchi, Wheezing	  Gastrointestinal:  Soft, Non-tender, + BS	  Skin: No rashes, No ecchymoses, No cyanosis  Extremities: Normal range of motion, No clubbing, cyanosis, +1 edema B/L   Vascular: Peripheral pulses palpable 2+ bilaterally  Neurologic: Non-focal  Psychiatry: A & O x 3, Mood & affect appropriate      	    ECG:  	  RADIOLOGY:   DIAGNOSTIC TESTING:  [ ] Echocardiogram:  [ ]  Catheterization:  [ ] Stress Test:    [ ] WILIAM  OTHER: 	    LABS:	 	  CARDIAC MARKERS:                                  10.1   8.23  )-----------( 220      ( 24 Apr 2019 07:36 )             35.7     04-24    142  |  99  |  32<H>  ----------------------------<  129<H>  4.0   |  33<H>  |  0.90    Ca    9.3      24 Apr 2019 07:36  Mg     2.1     04-24    TPro  8.3  /  Alb  3.4  /  TBili  0.7  /  DBili  x   /  AST  21  /  ALT  16  /  AlkPhos  179<H>  04-24    proBNP:   Lipid Profile:   HgA1c:   TSH:   PT/INR - ( 23 Apr 2019 07:17 )   PT: 24.4 sec;   INR: 2.11          PTT - ( 23 Apr 2019 07:17 )  PTT:37.9 sec    ASSESSMENT/PLAN: 	        DVT ppx:  Dispo: no

## 2020-02-17 NOTE — ED ADULT NURSE NOTE - NS ED NURSE LEVEL OF CONSCIOUSNESS MENTAL STATUS
Awake/Alert Rhombic Flap Text: The defect edges were debeveled with a #15 scalpel blade.  Given the location of the defect and the proximity to free margins a rhombic flap was deemed most appropriate.  Using a sterile surgical marker, an appropriate rhombic flap was drawn incorporating the defect.    The area thus outlined was incised deep to adipose tissue with a #15 scalpel blade.  The skin margins were undermined to an appropriate distance in all directions utilizing iris scissors.

## 2024-05-20 NOTE — PHYSICAL THERAPY INITIAL EVALUATION ADULT - STANDING BALANCE: STATIC
I spoke with pt and reminded her to have labs done prior to appt on 5/27/24 pt verbalized understanding.    fair balance